# Patient Record
Sex: FEMALE | Race: WHITE | NOT HISPANIC OR LATINO | Employment: FULL TIME | URBAN - METROPOLITAN AREA
[De-identification: names, ages, dates, MRNs, and addresses within clinical notes are randomized per-mention and may not be internally consistent; named-entity substitution may affect disease eponyms.]

---

## 2019-05-22 ENCOUNTER — OFFICE VISIT (OUTPATIENT)
Dept: OBSTETRICS AND GYNECOLOGY | Facility: CLINIC | Age: 49
End: 2019-05-22
Payer: COMMERCIAL

## 2019-05-22 VITALS
HEIGHT: 63 IN | BODY MASS INDEX: 24.8 KG/M2 | WEIGHT: 140 LBS | SYSTOLIC BLOOD PRESSURE: 170 MMHG | DIASTOLIC BLOOD PRESSURE: 100 MMHG

## 2019-05-22 DIAGNOSIS — Z12.31 SCREENING MAMMOGRAM, ENCOUNTER FOR: ICD-10-CM

## 2019-05-22 DIAGNOSIS — Z01.419 ENCOUNTER FOR GYNECOLOGICAL EXAMINATION WITHOUT ABNORMAL FINDING: Primary | ICD-10-CM

## 2019-05-22 PROCEDURE — S0610 ANNUAL GYNECOLOGICAL EXAMINA: HCPCS | Performed by: OBSTETRICS & GYNECOLOGY

## 2019-05-22 RX ORDER — ACETAMINOPHEN 160 MG
TABLET,CHEWABLE ORAL
COMMUNITY

## 2019-05-22 ASSESSMENT — ENCOUNTER SYMPTOMS
HEADACHES: 0
PALPITATIONS: 0
DYSURIA: 0
RECTAL PAIN: 0
DIFFICULTY URINATING: 0
ANAL BLEEDING: 0
APPETITE CHANGE: 0
ACTIVITY CHANGE: 0
WHEEZING: 0
ABDOMINAL DISTENTION: 0
DIARRHEA: 0
CONSTIPATION: 0
CONFUSION: 0
VOICE CHANGE: 0
APNEA: 0
FREQUENCY: 0
SHORTNESS OF BREATH: 0
ADENOPATHY: 0

## 2019-05-22 NOTE — PATIENT INSTRUCTIONS
Continue with self breast examinations, please get your mammogram done.  Your Pap smear was done and if abnormal I will call you.  Breast exam and pelvic exam was normal.  Take vitamin D3 2000 units on a daily basis.

## 2019-05-22 NOTE — PROGRESS NOTES
"2019  Cayla Burris is a 48 y.o. female who presents for annual exam.   Periods are regular every 28-30 days, lasting 3 days. Dysmenorrhea:none. Cyclic symptoms include none. No intermenstrual bleeding, spotting, or discharge.    The patient is sexually active. GYN screening history: last pap: was normal. Domestic violence: no.     Current contraception: none  History of abnormal Pap smear: no  Family history of uterine or ovarian cancer: no  Regular self breast exam: yes  History of abnormal mammogram: no  Family history of breast cancer: no  History of abnormal lipids: no  Menstrual History:  OB History      Para Term  AB Living    3 3 3     3    SAB TAB Ectopic Multiple Live Births            3         Patient's last menstrual period was 2019.         Review of Systems and Physical Exam  The following have been reviewed and updated as appropriate in this visit: Allergies  Meds       BP (!) 170/100   Ht 1.6 m (5' 3\")   Wt 63.5 kg (140 lb)   LMP 2019   BMI 24.80 kg/m²   HPI  Review of Systems   Constitutional: Negative for activity change and appetite change.   HENT: Negative for voice change.    Eyes: Negative for visual disturbance.   Respiratory: Negative for apnea, shortness of breath and wheezing.    Cardiovascular: Negative for chest pain and palpitations.   Gastrointestinal: Negative for abdominal distention, anal bleeding, constipation, diarrhea and rectal pain.   Genitourinary: Negative for difficulty urinating, dysuria, frequency and urgency.   Neurological: Negative for headaches.   Hematological: Negative for adenopathy.   Psychiatric/Behavioral: Negative for behavioral problems and confusion.     Physical Exam   Genitourinary:   Genitourinary Comments: Vulva normal vagina normal cervix normal uterus anterior normal size adnexa negative, Pap smear done   Pulmonary/Chest: Right breast exhibits no inverted nipple, no mass, no nipple discharge, no skin change and no " tenderness. Left breast exhibits no inverted nipple, no mass, no nipple discharge, no skin change and no tenderness.       Diagnoses and all orders for this visit:    Encounter for gynecological examination without abnormal finding  -     Cytology, Thinprep Pap  -     PAP AND HR HPV W/REFLEX TO GENOTYPING 16,18/45    Screening mammogram, encounter for  -     BI SCREENING MAMMOGRAM BILATERAL; Future    .  All questions answered.  Breast self exam technique reviewed and patient encouraged to perform self-exam monthly.  Diagnosis explained in detail, including differential.  Discussed healthy lifestyle modifications.  Mammogram.  Thin prep Pap smear..  Return in about 1 year (around 5/22/2020).  Dejuan Paulino MD

## 2019-05-28 LAB
CYTOLOGIST CVX/VAG CYTO: ABNORMAL
CYTOLOGY CVX/VAG DOC CYTO: ABNORMAL
CYTOLOGY CVX/VAG DOC THIN PREP: ABNORMAL
DX ICD CODE: ABNORMAL
DX ICD CODE: ABNORMAL
HPV I/H RISK 4 DNA CVX QL PROBE+SIG AMP: NEGATIVE
LAB CORP NOTE:: ABNORMAL
OTHER STN SPEC: ABNORMAL
PATHOLOGIST CVX/VAG CYTO: ABNORMAL
STAT OF ADQ CVX/VAG CYTO-IMP: ABNORMAL

## 2019-05-30 ENCOUNTER — DOCUMENTATION (OUTPATIENT)
Dept: OBSTETRICS AND GYNECOLOGY | Facility: CLINIC | Age: 49
End: 2019-05-30

## 2019-10-29 ENCOUNTER — OFFICE VISIT (OUTPATIENT)
Dept: OBSTETRICS AND GYNECOLOGY | Facility: CLINIC | Age: 49
End: 2019-10-29
Payer: COMMERCIAL

## 2019-10-29 VITALS
SYSTOLIC BLOOD PRESSURE: 140 MMHG | BODY MASS INDEX: 24.63 KG/M2 | WEIGHT: 139 LBS | DIASTOLIC BLOOD PRESSURE: 80 MMHG | HEIGHT: 63 IN

## 2019-10-29 DIAGNOSIS — N93.9 ABNORMAL UTERINE BLEEDING: Primary | ICD-10-CM

## 2019-10-29 PROCEDURE — 58100 BIOPSY OF UTERUS LINING: CPT | Performed by: OBSTETRICS & GYNECOLOGY

## 2019-10-29 PROCEDURE — 99213 OFFICE O/P EST LOW 20 MIN: CPT | Mod: 25 | Performed by: OBSTETRICS & GYNECOLOGY

## 2019-10-29 RX ORDER — METOPROLOL SUCCINATE 25 MG/1
TABLET, EXTENDED RELEASE ORAL
COMMUNITY
End: 2021-04-19 | Stop reason: ALTCHOICE

## 2019-10-29 RX ORDER — HYDROCHLOROTHIAZIDE 12.5 MG/1
CAPSULE ORAL
COMMUNITY
End: 2021-04-19 | Stop reason: ALTCHOICE

## 2019-10-29 RX ORDER — VALSARTAN 40 MG/1
TABLET ORAL
COMMUNITY
End: 2021-04-19 | Stop reason: ALTCHOICE

## 2019-10-29 RX ORDER — RIZATRIPTAN BENZOATE 10 MG/1
TABLET ORAL
COMMUNITY
End: 2024-03-18 | Stop reason: SDUPTHER

## 2019-10-29 NOTE — PROCEDURES
BX Endometrial  Date/Time: 10/29/2019 2:12 PM  Performed by: Marilee Reyes MD  Authorized by: Marilee Reyes MD     Consent:     Consent obtained:  Written    Consent given by:  Patient    Procedural risks discussed:  Bleeding, damage to other organs, failure rate, infection and repeat procedure    Patient questions answered: yes      Patient agrees, verbalizes understanding, and wants to proceed: yes    Indication:     Indications: Other disorder of menstruation and other abnormal bleeding from female genital tract    Procedure:     Procedure: endometrial biopsy with Pipelle      A bivalve speculum was placed in the vagina: yes      Cervix cleaned and prepped: yes      Uterus sounded: yes      Uterus sound depth (cm):  8    Specimen collected: specimen collected and sent to pathology      Patient tolerated procedure well with no complications: yes      Estimated blood loss: minimal  Findings:     Uterus size:  6-8 weeks    Cervix: normal      Adnexa: normal    Comments:     Procedure comments:  Pt with ongoing prolonged abnormal bleeding.  Recommend endometrial biopsy today.  Pt accepts.  R/B/A/C reviewed, consent reviewed and signed.  She is unable to void.  Her  had vasectomy and she does not believe there is any chance of pregnancy.    Cervix visualized and swabbed with betadine.  Uterus sounded to 8 cm.  3 passes made, good specimen obtained.  Pt tolerated procedure well.  Will send specimen to lab and call pt with results

## 2019-10-30 NOTE — PROGRESS NOTES
"Patient ID: Cayla Burris   : 1970  MRN: [id number]   Visit Date: 10/29/2019    Subjective   Cayla Burris is presenting today for Abnormal Uterine Bleeding    Pt was having fairly regular menses last year.  This year has skipped a few months.  Then recently last month started bleeding and this has continued daily for almost 4 weeks.  At times light, at times heavy gushing.  This has never happened before.      Objective   Vital Signs for this encounter:   Visit Vitals  /80   Ht 1.6 m (5' 3\")   Wt 63 kg (139 lb)   LMP  (LMP Unknown)   Breastfeeding? No   BMI 24.62 kg/m²       Obstetric History:   OB History    Para Term  AB Living   3 3 3     3   SAB TAB Ectopic Multiple Live Births           3      # Outcome Date GA Lbr Aleksandar/2nd Weight Sex Delivery Anes PTL Lv   3 Term      Vag-Spont   CADY   2 Term      Vag-Spont   CADY   1 Term      Vag-Spont   CADY     Past Medical History:  has a past medical history of HTN (hypertension), Migraine, Seasonal allergies, and Tobacco use.  Past Surgical History:  has a past surgical history that includes Vaginal delivery; Hernia repair; and Breast lumpectomy.  Family History: family history includes No Known Problems in her biological father and biological mother.  Social History:  reports that she has been smoking. She has never used smokeless tobacco. She reports that she drinks alcohol. She reports that she does not use drugs.  Medications:   Current Outpatient Medications:   •  hydrochlorothiazide (MICROZIDE) 12.5 mg capsule, hydrochlorothiazide 12.5 mg capsule, Disp: , Rfl:   •  loratadine (CLARITIN) 5 mg/5 mL syrup, No SIG Entered, Disp: , Rfl:   •  metoprolol succinate XL (TOPROL-XL) 25 mg 24 hr tablet, metoprolol succinate ER 25 mg tablet,extended release 24 hr, Disp: , Rfl:   •  rizatriptan (MAXALT) 10 mg tablet, rizatriptan 10 mg tablet  One tablet under tongue May be repeated in 2 hs. Wait 72 hours before repeating regimen You can take Excedrin " "migraine for 72 Hs, before repeating Rizatriptan again., Disp: , Rfl:   •  valsartan (DIOVAN) 40 mg tablet, valsartan 40 mg tablet, Disp: , Rfl:     Allergies: is allergic to penicillins.       Review of Systems and Physical Exam  The following have been reviewed and updated as appropriate in this visit: Allergies  Meds  Problems       Visit Vitals  /80   Ht 1.6 m (5' 3\")   Wt 63 kg (139 lb)   LMP  (LMP Unknown)   Breastfeeding? No   BMI 24.62 kg/m²       Review of Systems  Physical Exam   Constitutional: She appears well-developed and well-nourished.   Genitourinary: Uterus normal.   External female genitalia normal.   Urethral meatus normal. There is bleeding in the vagina.   Cervix exam normal.  Uterus is normal. Uterus is mobile. Uterus is not tender.   Adnexa normal. Right adnexum does not display tenderness and does not display fullness. Left adnexum does not display tenderness and does not display fullness.   Psychiatric: She has a normal mood and affect. Her behavior is normal.         Assessment/Plan   Diagnoses and all orders for this visit:    Abnormal uterine bleeding  -     DHEA-sulfate; Future  -     Follicle stimulating hormone; Future  -     Hemoglobin A1c; Future  -     Luteinizing hormone; Future  -     Prolactin; Future  -     T4, free; Future  -     Testosterone, free, total; Future  -     TSH; Future  -     ESTRADIOL; Future  -     CBC and Differential; Future  -     BhCG, Serum, Quant; Future  -     US PELVIS TRANSABDOMINAL & TRANSVAGINAL; Future  -     Pathology Tissue Exam  -     BX Endometrial    .    Discussed diff dx for abnormal prolonged bleeding- hormonal causes vs. Anatomic causes.  Could be related to perimenopausal changes but unclear.  Recommend endometrial biopsy today.  Pt accepts.  See proceudre note.  Will send for hormonal bloodwork and ultrasound.  Will call pt with results    Mgmt will depend on results.  Pt not eligible for OCPs due to HTN and occasional tobacco " use.  Discussed options of mirena IUD, ablation, etc briefly.  Bleeding may also improve on its own or pt may choose not to intervene.  Will discuss further once results available.          Marilee Reyes MD

## 2019-11-01 LAB
DX ICD CODE: NORMAL
DX ICD CODE: NORMAL
PATH REPORT.FINAL DX SPEC: NORMAL
PATH REPORT.GROSS SPEC: NORMAL
PATH REPORT.RELEVANT HX SPEC: NORMAL
PATH REPORT.SITE OF ORIGIN SPEC: NORMAL
PATHOLOGIST NAME: NORMAL
PAYMENT PROCEDURE: NORMAL

## 2019-11-06 ENCOUNTER — TELEPHONE (OUTPATIENT)
Dept: OBSTETRICS AND GYNECOLOGY | Facility: CLINIC | Age: 49
End: 2019-11-06

## 2019-11-06 NOTE — TELEPHONE ENCOUNTER
Spoke with pt.  Endo bx benign disordered prolif endo.  She will go for bloodwork and ultrasound soon.  Will need to decide to intervene based on menstrual symtpoms, pt to monitor.  All questionsn answered

## 2019-11-27 ENCOUNTER — HOSPITAL ENCOUNTER (OUTPATIENT)
Dept: RADIOLOGY | Age: 49
Discharge: HOME | End: 2019-11-27
Attending: OBSTETRICS & GYNECOLOGY
Payer: COMMERCIAL

## 2019-11-27 DIAGNOSIS — N93.9 ABNORMAL UTERINE BLEEDING: ICD-10-CM

## 2019-11-27 PROCEDURE — 76856 US EXAM PELVIC COMPLETE: CPT

## 2019-12-04 ENCOUNTER — TELEPHONE (OUTPATIENT)
Dept: OBSTETRICS AND GYNECOLOGY | Facility: CLINIC | Age: 49
End: 2019-12-04

## 2019-12-05 ENCOUNTER — TELEPHONE (OUTPATIENT)
Dept: OBSTETRICS AND GYNECOLOGY | Facility: CLINIC | Age: 49
End: 2019-12-05

## 2019-12-05 NOTE — TELEPHONE ENCOUNTER
Pt returning your call about US results. Please try again.    bones(Osteoporosis,prev fx,steroid use,metastatic bone ca)

## 2019-12-05 NOTE — TELEPHONE ENCOUNTER
Spoke with pt.  She is still having ongoing bleeding.  She still needs to go for bloodwork.      Discussed ultrasound results.  Right ov cyst 3.5 cm likely related to ovulation.  Uterine lining thickened and irrgular, endo bx was benign.  Discussed mgmt options at length including IUD and ablation, R/B/A?C reveiwed, pt will research and consider.  Will call pt with bloodwork results and make final decision.  All questions answered

## 2020-01-04 LAB
BASOPHILS # BLD AUTO: 0 X10E3/UL (ref 0–0.2)
BASOPHILS NFR BLD AUTO: 1 %
DHEA-S SERPL-MCNC: 252.9 UG/DL (ref 41.2–243.7)
EOSINOPHIL # BLD AUTO: 0.2 X10E3/UL (ref 0–0.4)
EOSINOPHIL NFR BLD AUTO: 3 %
ERYTHROCYTE [DISTWIDTH] IN BLOOD BY AUTOMATED COUNT: 13.1 % (ref 12.3–15.4)
ESTRADIOL SERPL-MCNC: 101.6 PG/ML
FSH SERPL-ACNC: 7.5 MIU/ML
HBA1C MFR BLD: 4.6 % (ref 4.8–5.6)
HCG INTACT+B SERPL-ACNC: <1 MIU/ML
HCT VFR BLD AUTO: 41.2 % (ref 34–46.6)
HGB BLD-MCNC: 14.2 G/DL (ref 11.1–15.9)
IMM GRANULOCYTES # BLD AUTO: 0 X10E3/UL (ref 0–0.1)
IMM GRANULOCYTES NFR BLD AUTO: 0 %
LH SERPL-ACNC: 5.3 MIU/ML
LYMPHOCYTES # BLD AUTO: 1.8 X10E3/UL (ref 0.7–3.1)
LYMPHOCYTES NFR BLD AUTO: 33 %
MCH RBC QN AUTO: 32.5 PG (ref 26.6–33)
MCHC RBC AUTO-ENTMCNC: 34.5 G/DL (ref 31.5–35.7)
MCV RBC AUTO: 94 FL (ref 79–97)
MONOCYTES # BLD AUTO: 0.5 X10E3/UL (ref 0.1–0.9)
MONOCYTES NFR BLD AUTO: 9 %
NEUTROPHILS # BLD AUTO: 2.9 X10E3/UL (ref 1.4–7)
NEUTROPHILS NFR BLD AUTO: 54 %
PLATELET # BLD AUTO: 228 X10E3/UL (ref 150–450)
PROLACTIN SERPL-MCNC: 19.5 NG/ML (ref 4.8–23.3)
RBC # BLD AUTO: 4.37 X10E6/UL (ref 3.77–5.28)
T4 FREE SERPL-MCNC: 1.13 NG/DL (ref 0.82–1.77)
TESTOST FREE SERPL-MCNC: 2.6 PG/ML (ref 0–4.2)
TESTOST SERPL-MCNC: 62 NG/DL (ref 8–48)
TSH SERPL DL<=0.005 MIU/L-ACNC: 6.17 UIU/ML (ref 0.45–4.5)
WBC # BLD AUTO: 5.4 X10E3/UL (ref 3.4–10.8)

## 2020-01-07 ENCOUNTER — TELEPHONE (OUTPATIENT)
Dept: OBSTETRICS AND GYNECOLOGY | Facility: CLINIC | Age: 50
End: 2020-01-07

## 2020-01-07 NOTE — TELEPHONE ENCOUNTER
Testosterone and DHEAS elevated, also TSH abnormal - may need to see PCP and get treated!  LMOM for pt to call back to discuss

## 2020-01-08 ENCOUNTER — TELEPHONE (OUTPATIENT)
Dept: OBSTETRICS AND GYNECOLOGY | Facility: CLINIC | Age: 50
End: 2020-01-08

## 2020-01-08 NOTE — TELEPHONE ENCOUNTER
Pt is returning your phone call. Pt lmom in office. She is calling about test results. Can you please try again.

## 2020-01-08 NOTE — TELEPHONE ENCOUNTER
Testosterone elevated, may be related to perimenopause.  TSH also 6.1, elevated, pt is having some symptoms, this could be related to abnormal bleeding as well.   Pt wishes to address this with PCP first and will monitor bleeding.  If bleeding still unacceptable will call, would consider IUD or ablation.  All questions answered

## 2021-02-15 ENCOUNTER — TELEPHONE (OUTPATIENT)
Dept: FAMILY MEDICINE | Facility: CLINIC | Age: 51
End: 2021-02-15

## 2021-02-15 NOTE — TELEPHONE ENCOUNTER
Patient wanted doctor to order her an xray because she thinks she broke her toe. Explained to patient that she is not yet a patient of Dr. Milligan. She will be after her visit in April. Urgent Care and ER are an option.

## 2021-02-23 ENCOUNTER — HOSPITAL ENCOUNTER (OUTPATIENT)
Dept: RADIOLOGY | Age: 51
Discharge: HOME | End: 2021-02-23
Attending: FAMILY MEDICINE
Payer: COMMERCIAL

## 2021-02-23 ENCOUNTER — TELEPHONE (OUTPATIENT)
Dept: OBSTETRICS AND GYNECOLOGY | Facility: CLINIC | Age: 51
End: 2021-02-23

## 2021-02-23 DIAGNOSIS — Z12.31 BREAST CANCER SCREENING BY MAMMOGRAM: ICD-10-CM

## 2021-02-23 DIAGNOSIS — Z12.31 BREAST CANCER SCREENING BY MAMMOGRAM: Primary | ICD-10-CM

## 2021-02-23 PROCEDURE — 77063 BREAST TOMOSYNTHESIS BI: CPT

## 2021-02-26 ENCOUNTER — HOSPITAL ENCOUNTER (OUTPATIENT)
Dept: RADIOLOGY | Age: 51
Discharge: HOME | End: 2021-02-26
Attending: RADIOLOGY
Payer: COMMERCIAL

## 2021-02-26 DIAGNOSIS — R92.8 ABNORMAL MAMMOGRAM: ICD-10-CM

## 2021-02-26 PROCEDURE — G0279 TOMOSYNTHESIS, MAMMO: HCPCS | Mod: LT

## 2021-02-26 PROCEDURE — 76642 ULTRASOUND BREAST LIMITED: CPT | Mod: LT

## 2021-03-30 ENCOUNTER — OFFICE VISIT (OUTPATIENT)
Dept: OBSTETRICS AND GYNECOLOGY | Facility: CLINIC | Age: 51
End: 2021-03-30
Payer: COMMERCIAL

## 2021-03-30 VITALS
DIASTOLIC BLOOD PRESSURE: 60 MMHG | BODY MASS INDEX: 26.58 KG/M2 | WEIGHT: 150 LBS | SYSTOLIC BLOOD PRESSURE: 120 MMHG | HEIGHT: 63 IN

## 2021-03-30 DIAGNOSIS — N63.0 LUMP OR MASS IN BREAST: ICD-10-CM

## 2021-03-30 DIAGNOSIS — Z01.419 ENCOUNTER FOR ANNUAL ROUTINE GYNECOLOGICAL EXAMINATION: Primary | ICD-10-CM

## 2021-03-30 PROCEDURE — S0612 ANNUAL GYNECOLOGICAL EXAMINA: HCPCS | Performed by: OBSTETRICS & GYNECOLOGY

## 2021-03-30 RX ORDER — CYCLOBENZAPRINE HCL 5 MG
TABLET ORAL
COMMUNITY
Start: 2021-03-22 | End: 2021-04-19 | Stop reason: SDUPTHER

## 2021-03-30 RX ORDER — AMLODIPINE BESYLATE 5 MG/1
5 TABLET ORAL
COMMUNITY
Start: 2021-02-06 | End: 2021-11-09 | Stop reason: SDUPTHER

## 2021-03-30 ASSESSMENT — ENCOUNTER SYMPTOMS
COUGH: 0
HEADACHES: 0
DIFFICULTY URINATING: 0
TROUBLE SWALLOWING: 0
BLOOD IN STOOL: 0
ABDOMINAL PAIN: 0
CONSTITUTIONAL NEGATIVE: 1
FREQUENCY: 0
ABDOMINAL DISTENTION: 0
DYSURIA: 0
FEVER: 0
SHORTNESS OF BREATH: 0
ARTHRALGIAS: 0
SORE THROAT: 0
WHEEZING: 0
FATIGUE: 0
PALPITATIONS: 0
BRUISES/BLEEDS EASILY: 0

## 2021-03-30 NOTE — PROGRESS NOTES
"Visit Date: 3/30/2021   Cayla Burris is 50 y.o. female presenting today for Annual GYN Exam    The following have been reviewed and updated as appropriate in this visit:      Visit Vitals  /60   Ht 1.6 m (5' 3\")   Wt 68 kg (150 lb)   LMP 2020   BMI 26.57 kg/m²     Menstrual History:  OB History        3    Para   3    Term   3            AB        Living   3       SAB        TAB        Ectopic        Multiple        Live Births   3                Patient's last menstrual period was 2020.       Medications:   Current Outpatient Medications:   •  amLODIPine (NORVASC) 5 mg tablet, Take 5 mg by mouth once daily., Disp: , Rfl:   •  cyclobenzaprine (FLEXERIL) 5 mg tablet, TAKE 1 TABLET BY MOUTH TWICE DAILY AFTER MEALS FOR 10 DAYS, Disp: , Rfl:   •  rizatriptan (MAXALT) 10 mg tablet, rizatriptan 10 mg tablet  One tablet under tongue May be repeated in 2 hs. Wait 72 hours before repeating regimen You can take Excedrin migraine for 72 Hs, before repeating Rizatriptan again., Disp: , Rfl:   •  hydrochlorothiazide (MICROZIDE) 12.5 mg capsule, hydrochlorothiazide 12.5 mg capsule, Disp: , Rfl:   •  loratadine (CLARITIN) 5 mg/5 mL syrup, No SIG Entered, Disp: , Rfl:   •  metoprolol succinate XL (TOPROL-XL) 25 mg 24 hr tablet, metoprolol succinate ER 25 mg tablet,extended release 24 hr, Disp: , Rfl:   •  valsartan (DIOVAN) 40 mg tablet, valsartan 40 mg tablet, Disp: , Rfl:     Allergies: is allergic to penicillins.     Past Medical History:  has a past medical history of Chronic fatigue, HTN (hypertension), Hypothyroid, Migraine, Seasonal allergies, and Tobacco use.  Past Surgical History:  has a past surgical history that includes Vaginal delivery; Hernia repair; and Breast lumpectomy.  Family History: family history includes No Known Problems in her biological father and biological mother.  Social History:  reports that she has been smoking. She has never used smokeless tobacco. She reports " current alcohol use. She reports that she does not use drugs.      HPI patient is here for routine gynecological check.  She notes that she has not had a menstrual cycle since November.  She is having some vasomotor symptoms during the day and they are not disruptive to her lifestyle.    Denies any bleeding.  No change in her bowel or bladder habits.  No change in her abdominal girth.    Patient's past medical history includes chronic fatigue hypertension hypothyroidism seasonal allergies and migraine patient recently had a mammogram that showed a possible lymph node a 6-month follow-up ultrasound is recommended.     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    Patient has 3 children her daughter is in college and was a school.  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  Review of Systems   Constitutional: Negative.  Negative for fatigue and fever.   HENT: Negative for ear pain, sore throat and trouble swallowing.    Respiratory: Negative for cough, shortness of breath and wheezing.    Cardiovascular: Negative for chest pain and palpitations.   Gastrointestinal: Negative for abdominal distention, abdominal pain and blood in stool.   Genitourinary: Negative for difficulty urinating, dysuria and frequency.   Musculoskeletal: Negative for arthralgias.   Neurological: Negative for headaches.   Hematological: Does not bruise/bleed easily.     Physical Exam  Constitutional:       Appearance: She is well-developed.   Genitourinary:      Genitourinary Comments: The external genitalia are within normal limits with no lesions.  The vagina has no lesions or discharge.  The cervix is pink, firm, mobile and nontender.  The uterus is anteverted, firm, mobile and nontender.  It is normal size.  The adnexa have no masses or tenderness.      The supraclavicular and axillary lymph nodes are nonenlarged.  The breasts have no masses, lesions or skin changes.      Eyes:      Pupils: Pupils are equal, round, and reactive to light.   Neck:      Musculoskeletal: Normal range of motion and neck supple.      Thyroid: No thyromegaly.   Cardiovascular:      Rate and Rhythm: Normal rate and regular rhythm.   Pulmonary:      Effort: Pulmonary effort is normal.   Abdominal:      General: Bowel sounds are normal. There is no distension.      Palpations: Abdomen is soft.      Tenderness: There is no abdominal tenderness. There is no guarding.   Neurological:      Mental Status: She is alert and oriented to person, place, and time.   Skin:     General: Skin is warm and dry.   Psychiatric:         Behavior: Behavior normal.       Problem List Items Addressed This Visit     None        A Pap smear was performed.  Mammogram slip was provided for 6 months along with an ultrasound.  Patient should return in 1 year      No follow-ups on file.  Voice recognition software used to produce this document. If errors are discovered,corrections will be made.   Domenic Sharma MD

## 2021-03-30 NOTE — PATIENT INSTRUCTIONS
I performed a Pap smear.  I will contact you by phone if there is a problem.  The results will be available on the patient portal.    A mammogram slip is provided. You should receive a letter with the results within a week after the procedure. If you do not receive a letter with the results, please call us.    Please return in 1 year for routine checkup.

## 2021-04-02 LAB
CYTOLOGIST CVX/VAG CYTO: NORMAL
CYTOLOGY CVX/VAG DOC CYTO: NORMAL
CYTOLOGY CVX/VAG DOC THIN PREP: NORMAL
DX ICD CODE: NORMAL
HPV I/H RISK 4 DNA CVX QL PROBE+SIG AMP: NEGATIVE
LAB CORP NOTE:: NORMAL
OTHER STN SPEC: NORMAL
STAT OF ADQ CVX/VAG CYTO-IMP: NORMAL

## 2021-04-19 ENCOUNTER — OFFICE VISIT (OUTPATIENT)
Dept: FAMILY MEDICINE | Facility: CLINIC | Age: 51
End: 2021-04-19
Payer: COMMERCIAL

## 2021-04-19 VITALS
WEIGHT: 147.8 LBS | TEMPERATURE: 98.3 F | DIASTOLIC BLOOD PRESSURE: 90 MMHG | RESPIRATION RATE: 13 BRPM | SYSTOLIC BLOOD PRESSURE: 140 MMHG | HEART RATE: 72 BPM | HEIGHT: 64 IN | OXYGEN SATURATION: 99 % | BODY MASS INDEX: 25.23 KG/M2

## 2021-04-19 DIAGNOSIS — R79.89 ELEVATED TESTOSTERONE LEVEL: ICD-10-CM

## 2021-04-19 DIAGNOSIS — I10 ESSENTIAL HYPERTENSION: Chronic | ICD-10-CM

## 2021-04-19 DIAGNOSIS — J30.2 SEASONAL ALLERGIES: Chronic | ICD-10-CM

## 2021-04-19 DIAGNOSIS — G43.009 MIGRAINE WITHOUT AURA AND WITHOUT STATUS MIGRAINOSUS, NOT INTRACTABLE: Chronic | ICD-10-CM

## 2021-04-19 DIAGNOSIS — B35.3 TINEA PEDIS OF RIGHT FOOT: Chronic | ICD-10-CM

## 2021-04-19 DIAGNOSIS — Z11.4 SCREENING FOR HUMAN IMMUNODEFICIENCY VIRUS: ICD-10-CM

## 2021-04-19 DIAGNOSIS — Z00.00 ENCOUNTER FOR WELL ADULT EXAM WITHOUT ABNORMAL FINDINGS: Primary | Chronic | ICD-10-CM

## 2021-04-19 DIAGNOSIS — G47.33 OSA ON CPAP: ICD-10-CM

## 2021-04-19 DIAGNOSIS — R53.83 FATIGUE, UNSPECIFIED TYPE: ICD-10-CM

## 2021-04-19 DIAGNOSIS — R79.89 ELEVATED TSH: Chronic | ICD-10-CM

## 2021-04-19 DIAGNOSIS — R79.89 ABNORMAL TSH: Chronic | ICD-10-CM

## 2021-04-19 DIAGNOSIS — Z11.59 NEED FOR HEPATITIS C SCREENING TEST: ICD-10-CM

## 2021-04-19 DIAGNOSIS — Z13.220 SCREENING, LIPID: ICD-10-CM

## 2021-04-19 PROCEDURE — 3077F SYST BP >= 140 MM HG: CPT | Performed by: FAMILY MEDICINE

## 2021-04-19 PROCEDURE — 3008F BODY MASS INDEX DOCD: CPT | Performed by: FAMILY MEDICINE

## 2021-04-19 PROCEDURE — 3080F DIAST BP >= 90 MM HG: CPT | Performed by: FAMILY MEDICINE

## 2021-04-19 PROCEDURE — 99204 OFFICE O/P NEW MOD 45 MIN: CPT | Performed by: FAMILY MEDICINE

## 2021-04-19 RX ORDER — CLOTRIMAZOLE AND BETAMETHASONE DIPROPIONATE 10; .64 MG/G; MG/G
CREAM TOPICAL 2 TIMES DAILY
Qty: 45 G | Refills: 1 | Status: SHIPPED | OUTPATIENT
Start: 2021-04-19 | End: 2021-05-19

## 2021-04-19 RX ORDER — CYCLOBENZAPRINE HCL 5 MG
5 TABLET ORAL 2 TIMES DAILY PRN
Qty: 30 TABLET | Refills: 1 | Status: SHIPPED | OUTPATIENT
Start: 2021-04-19 | End: 2022-04-15

## 2021-04-19 ASSESSMENT — ENCOUNTER SYMPTOMS
VOMITING: 0
ABDOMINAL DISTENTION: 0
DYSPHORIC MOOD: 0
CHEST TIGHTNESS: 0
NERVOUS/ANXIOUS: 0
POLYPHAGIA: 0
CONSTIPATION: 0
FATIGUE: 1
HEMATURIA: 0
DYSURIA: 0
SINUS PRESSURE: 0
SEIZURES: 0
MYALGIAS: 0
DECREASED CONCENTRATION: 0
HEADACHES: 1
WOUND: 0
EYE REDNESS: 0
ABDOMINAL PAIN: 0
WEAKNESS: 0
PHOTOPHOBIA: 0
VOICE CHANGE: 0
LIGHT-HEADEDNESS: 0
EYE PAIN: 0
SINUS PAIN: 0
POLYDIPSIA: 0
PALPITATIONS: 0
CHILLS: 0
SHORTNESS OF BREATH: 0
NAUSEA: 0
ARTHRALGIAS: 0
NECK STIFFNESS: 0
BRUISES/BLEEDS EASILY: 0
BACK PAIN: 0
NECK PAIN: 0
CONFUSION: 0
FLANK PAIN: 0
FEVER: 0
ADENOPATHY: 0
NUMBNESS: 0
JOINT SWELLING: 0
BLOOD IN STOOL: 0
WHEEZING: 0
DIZZINESS: 0
RHINORRHEA: 0
APNEA: 1
ACTIVITY CHANGE: 0
FREQUENCY: 0
SORE THROAT: 0
COUGH: 0
DIFFICULTY URINATING: 0

## 2021-04-19 ASSESSMENT — PATIENT HEALTH QUESTIONNAIRE - PHQ9: SUM OF ALL RESPONSES TO PHQ9 QUESTIONS 1 & 2: 0

## 2021-04-19 NOTE — ASSESSMENT & PLAN NOTE
Check and screen fasting labs  fluvax   tdap  covid vaccine  BMI reviewed   continue low fat carb diet, 3 meals daily . Avoid processed snacking between meals  Aerobic exercise 30 min or more 3-4 times per  Week  Dental visits q6m   Optho eye exam q1-2 years  Hearing screening nl  Wear seatbelts and no texting while driving  Wear spf 30 sunscreen or more while outside  for skin cancer protection   Gyn exam   Mammogram   htn controlled with meds   salina controlled with claritin  Migraines stable using prn maxalt  [id number]

## 2021-04-19 NOTE — PROGRESS NOTES
50 year old new patient presents to establish care   Former patient of dr Potter   Last seen 2019   oor followup in pandemic  Hx from patient and chart  fluvax 9/20  tdap ? Thinks in the last 10 years   covid vaccine needs to schedule    24 years  Children  19, 17, 15     C/o of rash between her right first and second toe for the last 2 weeks  Using otc cortisone  and has been helping    Hx of smoking   1 ppd per 3 days  Declined cessation today       htn   Dash diet  In the last 5 years  norvasc 5 mg one po qdaily   Has bp monitor but not taking regularly   Did not take today     Hx of headaches/migraine  Relates bitemporal  and triggered by her tmj syndrome  No aura  No n/v  mild photophobia  No preventative agents  Has Maxalt 10 mg as abortant but has not needed  Last headache over 10 m ago     C/o of snoring chronically  witnessed apnea by    Daytime fatigue   Had home sleep study showing moderate sleep apnea 11/20 sleep [facility]   Has cpap 11/20  and using needs to be more compliant   Not compliant   No change in weight   No followup     Hx of bruxism   Using mouth monroe and flexaril 5mg for prn use for spasm prn   Needs refill of flexaril      Was having DUB 10/19   Seen by her gyn Dr Reyes 10/19  Abn tsh 6.1, nl Free t4   DHEAS 252.9 testosterone total 62 , free  testo 2.6  Was referred to dr López who ordered followup labs   Then pandemic occurred and has not had any followup   Seen by Dr Sharma and had pap 3/30/21   No menses since 11/20 vasomotor sx's in the day  No further DUB       The following have been reviewed and updated as appropriate in this visit:  Allergies  Meds  Problems         Past Medical History:   Diagnosis Date   • Essential hypertension    • Migraine    • TIMO on CPAP    • Seasonal allergies    • Tobacco use        Past Surgical History:   Procedure Laterality Date   • BREAST LUMPECTOMY     • BUNIONECTOMY Left     1998   • HERNIA REPAIR     •  VAGINAL DELIVERY      x 3       Family History   Problem Relation Age of Onset   • No Known Problems Biological Father    • No Known Problems Biological Mother    • No Known Problems Biological Sister        Social History     Tobacco Use   • Smoking status: Current Some Day Smoker     Packs/day: 0.30     Years: 10.00     Pack years: 3.00     Types: Cigarettes   • Smokeless tobacco: Never Used   Vaping Use   • Vaping Use: Never used   Substance Use Topics   • Alcohol use: Yes     Alcohol/week: 2.0 standard drinks     Types: 2 Glasses of wine per week   • Drug use: No       Allergies   Allergen Reactions   • Penicillins      Other reaction(s): Rash       Current Outpatient Medications   Medication Sig Dispense Refill   • amLODIPine (NORVASC) 5 mg tablet Take 5 mg by mouth once daily.     • cyclobenzaprine (FLEXERIL) 5 mg tablet Take 1 tablet (5 mg total) by mouth 2 (two) times a day as needed for muscle spasms. 30 tablet 1   • loratadine (CLARITIN) 5 mg/5 mL syrup No SIG Entered     • rizatriptan (MAXALT) 10 mg tablet rizatriptan 10 mg tablet   One tablet under tongue May be repeated in 2 hs. Wait 72 hours before repeating regimen You can take Excedrin migraine for 72 Hs, before repeating Rizatriptan again.     • clotrimazole-betamethasone (LOTRISONE) 1-0.05 % cream Apply topically 2 (two) times a day. 45 g 1     No current facility-administered medications for this visit.       Review of Systems   Constitutional: Positive for fatigue. Negative for activity change, chills and fever.   HENT: Negative for congestion, ear discharge, ear pain, hearing loss, postnasal drip, rhinorrhea, sinus pressure, sinus pain, sneezing, sore throat, tinnitus and voice change.    Eyes: Negative for photophobia, pain, redness and visual disturbance.   Respiratory: Positive for apnea. Negative for cough, chest tightness, shortness of breath and wheezing.         Chronic snoring    Cardiovascular: Negative for chest pain, palpitations and  "leg swelling.   Gastrointestinal: Negative for abdominal distention, abdominal pain, blood in stool, constipation, nausea and vomiting.   Endocrine: Negative for cold intolerance, heat intolerance, polydipsia, polyphagia and polyuria.   Genitourinary: Negative for decreased urine volume, difficulty urinating, dyspareunia, dysuria, flank pain, frequency, hematuria, menstrual problem, pelvic pain, vaginal bleeding, vaginal discharge and vaginal pain.   Musculoskeletal: Negative for arthralgias, back pain, gait problem, joint swelling, myalgias, neck pain and neck stiffness.   Skin: Positive for rash. Negative for pallor and wound.        Right foot interdigitally between right first and second toe    Allergic/Immunologic: Negative for environmental allergies and food allergies.   Neurological: Positive for headaches. Negative for dizziness, seizures, weakness, light-headedness and numbness.   Hematological: Negative for adenopathy. Does not bruise/bleed easily.   Psychiatric/Behavioral: Negative for behavioral problems, confusion, decreased concentration and dysphoric mood. The patient is not nervous/anxious.        Objective     Vitals:    04/19/21 1448   BP: 140/90   Pulse: 72   Resp: 13   Temp: 36.8 °C (98.3 °F)   SpO2: 99%     Vitals:    04/19/21 1448   BP: 140/90   BP Location: Left upper arm   Patient Position: Sitting   Pulse: 72   Resp: 13   Temp: 36.8 °C (98.3 °F)   TempSrc: Oral   SpO2: 99%   Weight: 67 kg (147 lb 12.8 oz)   Height: 1.62 m (5' 3.78\")       Physical Exam  Vitals and nursing note reviewed.   Constitutional:       General: She is not in acute distress.     Appearance: She is not diaphoretic.   HENT:      Head: Normocephalic and atraumatic.      Right Ear: Tympanic membrane, ear canal and external ear normal.      Left Ear: Tympanic membrane, ear canal and external ear normal.      Nose: Nose normal.      Mouth/Throat:      Pharynx: No oropharyngeal exudate.   Eyes:      General: No scleral " icterus.        Right eye: No discharge.         Left eye: No discharge.      Conjunctiva/sclera: Conjunctivae normal.      Pupils: Pupils are equal, round, and reactive to light.   Neck:      Thyroid: No thyromegaly.      Vascular: No JVD.      Trachea: No tracheal deviation.   Cardiovascular:      Rate and Rhythm: Normal rate and regular rhythm.      Heart sounds: Normal heart sounds. No murmur heard.   No friction rub. No gallop.    Pulmonary:      Effort: Pulmonary effort is normal. No respiratory distress.      Breath sounds: Normal breath sounds. No wheezing or rales.   Chest:      Chest wall: No tenderness.   Abdominal:      General: Bowel sounds are normal. There is no distension.      Palpations: Abdomen is soft. There is no mass.      Tenderness: There is no abdominal tenderness. There is no guarding or rebound.      Hernia: No hernia is present.   Musculoskeletal:         General: No tenderness or deformity.      Cervical back: Normal range of motion and neck supple.   Lymphadenopathy:      Cervical: No cervical adenopathy.   Skin:     Coloration: Skin is not pale.      Findings: No erythema or rash.      Comments: Mild erythema and scaling  between first and second toes on the right    Neurological:      Mental Status: She is alert and oriented to person, place, and time.      Cranial Nerves: No cranial nerve deficit.      Sensory: No sensory deficit.      Motor: No abnormal muscle tone.      Coordination: Coordination normal.      Deep Tendon Reflexes: Reflexes normal.   Psychiatric:         Mood and Affect: Mood is anxious.         Speech: Speech normal.         Behavior: Behavior normal.         Thought Content: Thought content normal.         Judgment: Judgment normal.         Lab Results   Component Value Date    WBC 5.4 01/03/2020    HGB 14.2 01/03/2020    HCT 41.2 01/03/2020    MCV 94 01/03/2020     01/03/2020         Chemistry    No results found for: NA, K, CL, CO2, BUN, CREATININE, GLU  No results found for: CALCIUM, ALKPHOS, AST, ALT, BILITOT         No results found for: CHOL  No results found for: HDL  No results found for: LDLCALC  No results found for: TRIG  No results found for: CHOLHDL    Lab Results   Component Value Date    TSH 6.170 (H) 01/03/2020       Lab Results   Component Value Date    HGBA1C 4.6 (L) 01/03/2020       No results found for: HAV, HEPAIGM, HEPBIGM, HEPBCAB, HBEAG, HEPCAB    No results found for: MICROALBUR, [id number]    Assessment/Plan     1. Encounter for well adult exam without abnormal findings    2. Screening, lipid    3. Screening for human immunodeficiency virus    4. Need for hepatitis C screening test    5. Fatigue, unspecified type    6. TIMO on CPAP    7. Elevated testosterone level    8. Essential hypertension    9. Migraine without aura and without status migrainosus, not intractable    10. Tinea pedis of right foot    11. Seasonal allergies    12. Abnormal TSH    13. Elevated TSH        Problem List Items Addressed This Visit        Nervous    Migraine (Chronic)    Current Assessment & Plan     will request old records from previous pcp dr Potter to review  Has not had headache in the last 10mg   Has Maxalt 10mg but has not needed  Controlled TMJ and using mouth monroe and compliant   Managing her stress despite pandemic    She will call if any worsening headaches  or increase in frequency   Will monitor          Relevant Medications    cyclobenzaprine (FLEXERIL) 5 mg tablet       Respiratory    TIMO on CPAP (Chronic)    Current Assessment & Plan     Dx moderate sleep apnea  11/20 [facility] sleep [facility]  will request old records for review  Not compliant with cpap to day started 11/20  Needs to followup  Provided her info regarding [facility] Dr Posada if wants to transition her care  Fatigue improved          Relevant Orders    Ambulatory referral to Sleep Medicine       Circulatory    Essential hypertension (Chronic)    Current Assessment & Plan     bp  elevated today  Did not take norvasc 5mg one po qdaily today   Dash diet reviewed   Limited caffeine  Asymptomatic  Reviewed compliance with Norvasc 5mg one po qdaily   Tolerating without any side effects  Has home bp monitor and will start monitoring and call if any values over 140 and 85.   rtoq 1m               Infectious/Inflammatory    Tinea pedis of right foot (Chronic)    Current Assessment & Plan     Right interdigital space between  first and second toe  Will rx lotrisone  creme apply bid   She will call if any persistent rash          Relevant Medications    clotrimazole-betamethasone (LOTRISONE) 1-0.05 % cream       Other    Seasonal allergies (Chronic)    Current Assessment & Plan     Stable   Using claritin otc and stable   Will call if any pnd or nasal congestion         Elevated testosterone level (Chronic)    Current Assessment & Plan     screening testosterone level 252.9 1/20  Nl free testo 2.6  No followup  Will refer to dr Mosley for followup            Relevant Orders    Ambulatory referral to Endocrinology    Elevated TSH (Chronic)    Current Assessment & Plan     screening tsh 6.170 1/20  Nl free t4   Will recheck labs and follow          Encounter for well adult exam without abnormal findings - Primary    Relevant Orders    Lipid panel      Other Visit Diagnoses     Screening, lipid        Screening for human immunodeficiency virus        Relevant Orders    HIV 1,2 AB P24 AG    Need for hepatitis C screening test        Relevant Orders    Hepatitis C antibody    Fatigue, unspecified type        Relevant Orders    Comprehensive metabolic panel    CBC and differential    TSH    T4, free          Return in about 1 month (around 5/19/2021).    Orders Placed This Encounter   Procedures   • Comprehensive metabolic panel     Standing Status:   Future     Number of Occurrences:   1     Standing Expiration Date:   4/19/2022     Order Specific Question:   Release to patient     Answer:   Immediate   •  CBC and differential     Standing Status:   Future     Number of Occurrences:   1     Standing Expiration Date:   4/19/2022     Order Specific Question:   Release to patient     Answer:   Immediate   • Lipid panel     Standing Status:   Future     Number of Occurrences:   1     Standing Expiration Date:   4/19/2022     Order Specific Question:   Release to patient     Answer:   Immediate   • TSH     Standing Status:   Future     Number of Occurrences:   1     Standing Expiration Date:   4/19/2022     Order Specific Question:   Release to patient     Answer:   Immediate   • T4, free     Standing Status:   Future     Number of Occurrences:   1     Standing Expiration Date:   4/19/2022     Order Specific Question:   Release to patient     Answer:   Immediate   • HIV 1,2 AB P24 AG     Standing Status:   Future     Number of Occurrences:   1     Standing Expiration Date:   4/19/2022     Order Specific Question:   Release to patient     Answer:   Manual release only   • Hepatitis C antibody     Standing Status:   Future     Number of Occurrences:   1     Standing Expiration Date:   4/19/2022     Order Specific Question:   Release to patient     Answer:   Immediate   • Ambulatory referral to Sleep Medicine     If you are ordering a Sleep Study Procedure please place order   SLE3 -  POLYSOMNOGRAM or a similar appropriate sleep testing order.      Standing Status:   Future     Standing Expiration Date:   10/19/2021     Referral Priority:   Routine     Referral Type:   Consultation     Referral Reason:   Specialty Services Required     Number of Visits Requested:   10   • Ambulatory referral to Endocrinology     Standing Status:   Future     Standing Expiration Date:   10/19/2021     Referral Priority:   Routine     Referral Type:   Consultation     Referral Reason:   Specialty Services Required     Referred to Provider:   Bryant Mosley MD     Requested Specialty:   Endocrinology     Number of Visits Requested:   10            Ramone Milligan, DO  4/19/2021

## 2021-04-19 NOTE — PATIENT INSTRUCTIONS
Patient Education     Health Maintenance, Female  Adopting a healthy lifestyle and getting preventive care are important in promoting health and wellness. Ask your health care provider about:  · The right schedule for you to have regular tests and exams.  · Things you can do on your own to prevent diseases and keep yourself healthy.  What should I know about diet, weight, and exercise?  Eat a healthy diet    · Eat a diet that includes plenty of vegetables, fruits, low-fat dairy products, and lean protein.  · Do not eat a lot of foods that are high in solid fats, added sugars, or sodium.  Maintain a healthy weight  Body mass index (BMI) is used to identify weight problems. It estimates body fat based on height and weight. Your health care provider can help determine your BMI and help you achieve or maintain a healthy weight.  Get regular exercise  Get regular exercise. This is one of the most important things you can do for your health. Most adults should:  · Exercise for at least 150 minutes each week. The exercise should increase your heart rate and make you sweat (moderate-intensity exercise).  · Do strengthening exercises at least twice a week. This is in addition to the moderate-intensity exercise.  · Spend less time sitting. Even light physical activity can be beneficial.  Watch cholesterol and blood lipids  Have your blood tested for lipids and cholesterol at 20 years of age, then have this test every 5 years.  Have your cholesterol levels checked more often if:  · Your lipid or cholesterol levels are high.  · You are older than 40 years of age.  · You are at high risk for heart disease.  What should I know about cancer screening?  Depending on your health history and family history, you may need to have cancer screening at various ages. This may include screening for:  · Breast cancer.  · Cervical cancer.  · Colorectal cancer.  · Skin cancer.  · Lung cancer.  What should I know about heart disease,  diabetes, and high blood pressure?  Blood pressure and heart disease  · High blood pressure causes heart disease and increases the risk of stroke. This is more likely to develop in people who have high blood pressure readings, are of  descent, or are overweight.  · Have your blood pressure checked:  ? Every 3-5 years if you are 18-39 years of age.  ? Every year if you are 40 years old or older.  Diabetes  Have regular diabetes screenings. This checks your fasting blood sugar level. Have the screening done:  · Once every three years after age 40 if you are at a normal weight and have a low risk for diabetes.  · More often and at a younger age if you are overweight or have a high risk for diabetes.  What should I know about preventing infection?  Hepatitis B  If you have a higher risk for hepatitis B, you should be screened for this virus. Talk with your health care provider to find out if you are at risk for hepatitis B infection.  Hepatitis C  Testing is recommended for:  · Everyone born from 1945 through 1965.  · Anyone with known risk factors for hepatitis C.  Sexually transmitted infections (STIs)  · Get screened for STIs, including gonorrhea and chlamydia, if:  ? You are sexually active and are younger than 24 years of age.  ? You are older than 24 years of age and your health care provider tells you that you are at risk for this type of infection.  ? Your sexual activity has changed since you were last screened, and you are at increased risk for chlamydia or gonorrhea. Ask your health care provider if you are at risk.  · Ask your health care provider about whether you are at high risk for HIV. Your health care provider may recommend a prescription medicine to help prevent HIV infection. If you choose to take medicine to prevent HIV, you should first get tested for HIV. You should then be tested every 3 months for as long as you are taking the medicine.  Pregnancy  · If you are about to stop having your  period (premenopausal) and you may become pregnant, seek counseling before you get pregnant.  · Take 400 to 800 micrograms (mcg) of folic acid every day if you become pregnant.  · Ask for birth control (contraception) if you want to prevent pregnancy.  Osteoporosis and menopause  Osteoporosis is a disease in which the bones lose minerals and strength with aging. This can result in bone fractures. If you are 65 years old or older, or if you are at risk for osteoporosis and fractures, ask your health care provider if you should:  · Be screened for bone loss.  · Take a calcium or vitamin D supplement to lower your risk of fractures.  · Be given hormone replacement therapy (HRT) to treat symptoms of menopause.  Follow these instructions at home:  Lifestyle  · Do not use any products that contain nicotine or tobacco, such as cigarettes, e-cigarettes, and chewing tobacco. If you need help quitting, ask your health care provider.  · Do not use street drugs.  · Do not share needles.  · Ask your health care provider for help if you need support or information about quitting drugs.  Alcohol use  · Do not drink alcohol if:  ? Your health care provider tells you not to drink.  ? You are pregnant, may be pregnant, or are planning to become pregnant.  · If you drink alcohol:  ? Limit how much you use to 0-1 drink a day.  ? Limit intake if you are breastfeeding.  · Be aware of how much alcohol is in your drink. In the [place] one drink equals one 12 oz bottle of beer (355 mL), one 5 oz glass of wine (148 mL), or one 1½ oz glass of hard liquor (44 mL).  General instructions  · Schedule regular health, dental, and eye exams.  · Stay current with your vaccines.  · Tell your health care provider if:  ? You often feel depressed.  ? You have ever been abused or do not feel safe at home.  Summary  · Adopting a healthy lifestyle and getting preventive care are important in promoting health and wellness.  · Follow your health care provider's  instructions about healthy diet, exercising, and getting tested or screened for diseases.  · Follow your health care provider's instructions on monitoring your cholesterol and blood pressure.  This information is not intended to replace advice given to you by your health care provider. Make sure you discuss any questions you have with your health care provider.  Document Released: 07/02/2012 Document Revised: 12/11/2019 Document Reviewed: 12/11/2019  [employer] Patient Education © 2020 [employer]       Patient Education     Preventive Care 40-64 Years Old, Female  Preventive care refers to visits with your health care provider and lifestyle choices that can promote health and wellness. This includes:  · A yearly physical exam. This may also be called an annual well check.  · Regular dental visits and eye exams.  · Immunizations.  · Screening for certain conditions.  · Healthy lifestyle choices, such as eating a healthy diet, getting regular exercise, not using drugs or products that contain nicotine and tobacco, and limiting alcohol use.  What can I expect for my preventive care visit?  Physical exam  Your health care provider will check your:  · Height and weight. This may be used to calculate body mass index (BMI), which tells if you are at a healthy weight.  · Heart rate and blood pressure.  · Skin for abnormal spots.  Counseling  Your health care provider may ask you questions about your:  · Alcohol, tobacco, and drug use.  · Emotional well-being.  · Home and relationship well-being.  · Sexual activity.  · Eating habits.  · Work and work environment.  · Method of birth control.  · Menstrual cycle.  · Pregnancy history.  What immunizations do I need?    Influenza (flu) vaccine  · This is recommended every year.  Tetanus, diphtheria, and pertussis (Tdap) vaccine  · You may need a Td booster every 10 years.  Varicella (chickenpox) vaccine  · You may need this if you have not been vaccinated.  Zoster (shingles)  vaccine  · You may need this after age 60.  Measles, mumps, and rubella (MMR) vaccine  · You may need at least one dose of MMR if you were born in 1957 or later. You may also need a second dose.  Pneumococcal conjugate (PCV13) vaccine  · You may need this if you have certain conditions and were not previously vaccinated.  Pneumococcal polysaccharide (PPSV23) vaccine  · You may need one or two doses if you smoke cigarettes or if you have certain conditions.  Meningococcal conjugate (MenACWY) vaccine  · You may need this if you have certain conditions.  Hepatitis A vaccine  · You may need this if you have certain conditions or if you travel or work in places where you may be exposed to hepatitis A.  Hepatitis B vaccine  · You may need this if you have certain conditions or if you travel or work in places where you may be exposed to hepatitis B.  Haemophilus influenzae type b (Hib) vaccine  · You may need this if you have certain conditions.  Human papillomavirus (HPV) vaccine  · If recommended by your health care provider, you may need three doses over 6 months.  You may receive vaccines as individual doses or as more than one vaccine together in one shot (combination vaccines). Talk with your health care provider about the risks and benefits of combination vaccines.  What tests do I need?  Blood tests  · Lipid and cholesterol levels. These may be checked every 5 years, or more frequently if you are over 50 years old.  · Hepatitis C test.  · Hepatitis B test.  Screening  · Lung cancer screening. You may have this screening every year starting at age 55 if you have a 30-pack-year history of smoking and currently smoke or have quit within the past 15 years.  · Colorectal cancer screening. All adults should have this screening starting at age 50 and continuing until age 75. Your health care provider may recommend screening at age 45 if you are at increased risk. You will have tests every 1-10 years, depending on your  results and the type of screening test.  · Diabetes screening. This is done by checking your blood sugar (glucose) after you have not eaten for a while (fasting). You may have this done every 1-3 years.  · Mammogram. This may be done every 1-2 years. Talk with your health care provider about when you should start having regular mammograms. This may depend on whether you have a family history of breast cancer.  · BRCA-related cancer screening. This may be done if you have a family history of breast, ovarian, tubal, or peritoneal cancers.  · Pelvic exam and Pap test. This may be done every 3 years starting at age 21. Starting at age 30, this may be done every 5 years if you have a Pap test in combination with an HPV test.  Other tests  · Sexually transmitted disease (STD) testing.  · Bone density scan. This is done to screen for osteoporosis. You may have this scan if you are at high risk for osteoporosis.  Follow these instructions at home:  Eating and drinking  · Eat a diet that includes fresh fruits and vegetables, whole grains, lean protein, and low-fat dairy.  · Take vitamin and mineral supplements as recommended by your health care provider.  · Do not drink alcohol if:  ? Your health care provider tells you not to drink.  ? You are pregnant, may be pregnant, or are planning to become pregnant.  · If you drink alcohol:  ? Limit how much you have to 0-1 drink a day.  ? Be aware of how much alcohol is in your drink. In the [place] one drink equals one 12 oz bottle of beer (355 mL), one 5 oz glass of wine (148 mL), or one 1½ oz glass of hard liquor (44 mL).  Lifestyle  · Take daily care of your teeth and gums.  · Stay active. Exercise for at least 30 minutes on 5 or more days each week.  · Do not use any products that contain nicotine or tobacco, such as cigarettes, e-cigarettes, and chewing tobacco. If you need help quitting, ask your health care provider.  · If you are sexually active, practice safe sex. Use a  "condom or other form of birth control (contraception) in order to prevent pregnancy and STIs (sexually transmitted infections).  · If told by your health care provider, take low-dose aspirin daily starting at age 50.  What's next?  · Visit your health care provider once a year for a well check visit.  · Ask your health care provider how often you should have your eyes and teeth checked.  · Stay up to date on all vaccines.  This information is not intended to replace advice given to you by your health care provider. Make sure you discuss any questions you have with your health care provider.  Document Released: 01/13/2017 Document Revised: 08/29/2019 Document Reviewed: 08/29/2019  [employer] Patient Education © 2020 [employer]       Patient Education     Hypertension, Adult  High blood pressure (hypertension) is when the force of blood pumping through the arteries is too strong. The arteries are the blood vessels that carry blood from the heart throughout the body. Hypertension forces the heart to work harder to pump blood and may cause arteries to become narrow or stiff. Untreated or uncontrolled hypertension can cause a heart attack, heart failure, a stroke, kidney disease, and other problems.  A blood pressure reading consists of a higher number over a lower number. Ideally, your blood pressure should be below 120/80. The first (\"top\") number is called the systolic pressure. It is a measure of the pressure in your arteries as your heart beats. The second (\"bottom\") number is called the diastolic pressure. It is a measure of the pressure in your arteries as the heart relaxes.  What are the causes?  The exact cause of this condition is not known. There are some conditions that result in or are related to high blood pressure.  What increases the risk?  Some risk factors for high blood pressure are under your control. The following factors may make you more likely to develop this condition:  · Smoking.  · Having type " 2 diabetes mellitus, high cholesterol, or both.  · Not getting enough exercise or physical activity.  · Being overweight.  · Having too much fat, sugar, calories, or salt (sodium) in your diet.  · Drinking too much alcohol.  Some risk factors for high blood pressure may be difficult or impossible to change. Some of these factors include:  · Having chronic kidney disease.  · Having a family history of high blood pressure.  · Age. Risk increases with age.  · Race. You may be at higher risk if you are .  · Gender. Men are at higher risk than women before age 45. After age 65, women are at higher risk than men.  · Having obstructive sleep apnea.  · Stress.  What are the signs or symptoms?  High blood pressure may not cause symptoms. Very high blood pressure (hypertensive crisis) may cause:  · Headache.  · Anxiety.  · Shortness of breath.  · Nosebleed.  · Nausea and vomiting.  · Vision changes.  · Severe chest pain.  · Seizures.  How is this diagnosed?  This condition is diagnosed by measuring your blood pressure while you are seated, with your arm resting on a flat surface, your legs uncrossed, and your feet flat on the floor. The cuff of the blood pressure monitor will be placed directly against the skin of your upper arm at the level of your heart. It should be measured at least twice using the same arm. Certain conditions can cause a difference in blood pressure between your right and left arms.  Certain factors can cause blood pressure readings to be lower or higher than normal for a short period of time:  · When your blood pressure is higher when you are in a health care provider's office than when you are at home, this is called white coat hypertension. Most people with this condition do not need medicines.  · When your blood pressure is higher at home than when you are in a health care provider's office, this is called masked hypertension. Most people with this condition may need medicines to  control blood pressure.  If you have a high blood pressure reading during one visit or you have normal blood pressure with other risk factors, you may be asked to:  · Return on a different day to have your blood pressure checked again.  · Monitor your blood pressure at home for 1 week or longer.  If you are diagnosed with hypertension, you may have other blood or imaging tests to help your health care provider understand your overall risk for other conditions.  How is this treated?  This condition is treated by making healthy lifestyle changes, such as eating healthy foods, exercising more, and reducing your alcohol intake. Your health care provider may prescribe medicine if lifestyle changes are not enough to get your blood pressure under control, and if:  · Your systolic blood pressure is above 130.  · Your diastolic blood pressure is above 80.  Your personal target blood pressure may vary depending on your medical conditions, your age, and other factors.  Follow these instructions at home:  Eating and drinking    · Eat a diet that is high in fiber and potassium, and low in sodium, added sugar, and fat. An example eating plan is called the DASH (Dietary Approaches to Stop Hypertension) diet. To eat this way:  ? Eat plenty of fresh fruits and vegetables. Try to fill one half of your plate at each meal with fruits and vegetables.  ? Eat whole grains, such as whole-wheat pasta, brown rice, or whole-grain bread. Fill about one fourth of your plate with whole grains.  ? Eat or drink low-fat dairy products, such as skim milk or low-fat yogurt.  ? Avoid fatty cuts of meat, processed or cured meats, and poultry with skin. Fill about one fourth of your plate with lean proteins, such as fish, chicken without skin, beans, eggs, or tofu.  ? Avoid pre-made and processed foods. These tend to be higher in sodium, added sugar, and fat.  · Reduce your daily sodium intake. Most people with hypertension should eat less than 1,500 mg  of sodium a day.  · Do not drink alcohol if:  ? Your health care provider tells you not to drink.  ? You are pregnant, may be pregnant, or are planning to become pregnant.  · If you drink alcohol:  ? Limit how much you use to:  § 0-1 drink a day for women.  § 0-2 drinks a day for men.  ? Be aware of how much alcohol is in your drink. In the [place] one drink equals one 12 oz bottle of beer (355 mL), one 5 oz glass of wine (148 mL), or one 1½ oz glass of hard liquor (44 mL).  Lifestyle    · Work with your health care provider to maintain a healthy body weight or to lose weight. Ask what an ideal weight is for you.  · Get at least 30 minutes of exercise most days of the week. Activities may include walking, swimming, or biking.  · Include exercise to strengthen your muscles (resistance exercise), such as Pilates or lifting weights, as part of your weekly exercise routine. Try to do these types of exercises for 30 minutes at least 3 days a week.  · Do not use any products that contain nicotine or tobacco, such as cigarettes, e-cigarettes, and chewing tobacco. If you need help quitting, ask your health care provider.  · Monitor your blood pressure at home as told by your health care provider.  · Keep all follow-up visits as told by your health care provider. This is important.  Medicines  · Take over-the-counter and prescription medicines only as told by your health care provider. Follow directions carefully. Blood pressure medicines must be taken as prescribed.  · Do not skip doses of blood pressure medicine. Doing this puts you at risk for problems and can make the medicine less effective.  · Ask your health care provider about side effects or reactions to medicines that you should watch for.  Contact a health care provider if you:  · Think you are having a reaction to a medicine you are taking.  · Have headaches that keep coming back (recurring).  · Feel dizzy.  · Have swelling in your ankles.  · Have trouble with your  "vision.  Get help right away if you:  · Develop a severe headache or confusion.  · Have unusual weakness or numbness.  · Feel faint.  · Have severe pain in your chest or abdomen.  · Vomit repeatedly.  · Have trouble breathing.  Summary  · Hypertension is when the force of blood pumping through your arteries is too strong. If this condition is not controlled, it may put you at risk for serious complications.  · Your personal target blood pressure may vary depending on your medical conditions, your age, and other factors. For most people, a normal blood pressure is less than 120/80.  · Hypertension is treated with lifestyle changes, medicines, or a combination of both. Lifestyle changes include losing weight, eating a healthy, low-sodium diet, exercising more, and limiting alcohol.  This information is not intended to replace advice given to you by your health care provider. Make sure you discuss any questions you have with your health care provider.  Document Released: 12/18/2006 Document Revised: 08/28/2019 Document Reviewed: 08/28/2019  [employer] Patient Education © 2020 [employer]       Patient Education     DASH Eating Plan  DASH stands for \"Dietary Approaches to Stop Hypertension.\" The DASH eating plan is a healthy eating plan that has been shown to reduce high blood pressure (hypertension). It may also reduce your risk for type 2 diabetes, heart disease, and stroke. The DASH eating plan may also help with weight loss.  What are tips for following this plan?    General guidelines  · Avoid eating more than 2,300 mg (milligrams) of salt (sodium) a day. If you have hypertension, you may need to reduce your sodium intake to 1,500 mg a day.  · Limit alcohol intake to no more than 1 drink a day for nonpregnant women and 2 drinks a day for men. One drink equals 12 oz of beer, 5 oz of wine, or 1½ oz of hard liquor.  · Work with your health care provider to maintain a healthy body weight or to lose weight. Ask what an " "ideal weight is for you.  · Get at least 30 minutes of exercise that causes your heart to beat faster (aerobic exercise) most days of the week. Activities may include walking, swimming, or biking.  · Work with your health care provider or diet and nutrition specialist (dietitian) to adjust your eating plan to your individual calorie needs.  Reading food labels    · Check food labels for the amount of sodium per serving. Choose foods with less than 5 percent of the Daily Value of sodium. Generally, foods with less than 300 mg of sodium per serving fit into this eating plan.  · To find whole grains, look for the word \"whole\" as the first word in the ingredient list.  Shopping  · Buy products labeled as \"low-sodium\" or \"no salt added.\"  · Buy fresh foods. Avoid canned foods and premade or frozen meals.  Cooking  · Avoid adding salt when cooking. Use salt-free seasonings or herbs instead of table salt or sea salt. Check with your health care provider or pharmacist before using salt substitutes.  · Do not  foods. Cook foods using healthy methods such as baking, boiling, grilling, and broiling instead.  · Cook with heart-healthy oils, such as olive, canola, soybean, or sunflower oil.  Meal planning  · Eat a balanced diet that includes:  ? 5 or more servings of fruits and vegetables each day. At each meal, try to fill half of your plate with fruits and vegetables.  ? Up to 6-8 servings of whole grains each day.  ? Less than 6 oz of lean meat, poultry, or fish each day. A 3-oz serving of meat is about the same size as a deck of cards. One egg equals 1 oz.  ? 2 servings of low-fat dairy each day.  ? A serving of nuts, seeds, or beans 5 times each week.  ? Heart-healthy fats. Healthy fats called Omega-3 fatty acids are found in foods such as flaxseeds and coldwater fish, like sardines, salmon, and mackerel.  · Limit how much you eat of the following:  ? Canned or prepackaged foods.  ? Food that is high in trans fat, such " as fried foods.  ? Food that is high in saturated fat, such as fatty meat.  ? Sweets, desserts, sugary drinks, and other foods with added sugar.  ? Full-fat dairy products.  · Do not salt foods before eating.  · Try to eat at least 2 vegetarian meals each week.  · Eat more home-cooked food and less restaurant, buffet, and fast food.  · When eating at a restaurant, ask that your food be prepared with less salt or no salt, if possible.  What foods are recommended?  The items listed may not be a complete list. Talk with your dietitian about what dietary choices are best for you.  Grains  Whole-grain or whole-wheat bread. Whole-grain or whole-wheat pasta. Brown rice. Oatmeal. Quinoa. Bulgur. Whole-grain and low-sodium cereals. Mackenzie bread. Low-fat, low-sodium crackers. Whole-wheat flour tortillas.  Vegetables  Fresh or frozen vegetables (raw, steamed, roasted, or grilled). Low-sodium or reduced-sodium tomato and vegetable juice. Low-sodium or reduced-sodium tomato sauce and tomato paste. Low-sodium or reduced-sodium canned vegetables.  Fruits  All fresh, dried, or frozen fruit. Canned fruit in natural juice (without added sugar).  Meat and other protein foods  Skinless chicken or turkey. Ground chicken or turkey. Pork with fat trimmed off. Fish and seafood. Egg whites. Dried beans, peas, or lentils. Unsalted nuts, nut butters, and seeds. Unsalted canned beans. Lean cuts of beef with fat trimmed off. Low-sodium, lean deli meat.  Dairy  Low-fat (1%) or fat-free (skim) milk. Fat-free, low-fat, or reduced-fat cheeses. Nonfat, low-sodium ricotta or cottage cheese. Low-fat or nonfat yogurt. Low-fat, low-sodium cheese.  Fats and oils  Soft margarine without trans fats. Vegetable oil. Low-fat, reduced-fat, or light mayonnaise and salad dressings (reduced-sodium). Canola, safflower, olive, soybean, and sunflower oils. Avocado.  Seasoning and other foods  Herbs. Spices. Seasoning mixes without salt. Unsalted popcorn and pretzels.  Fat-free sweets.  What foods are not recommended?  The items listed may not be a complete list. Talk with your dietitian about what dietary choices are best for you.  Grains  Baked goods made with fat, such as croissants, muffins, or some breads. Dry pasta or rice meal packs.  Vegetables  Creamed or fried vegetables. Vegetables in a cheese sauce. Regular canned vegetables (not low-sodium or reduced-sodium). Regular canned tomato sauce and paste (not low-sodium or reduced-sodium). Regular tomato and vegetable juice (not low-sodium or reduced-sodium). Pickles. Olives.  Fruits  Canned fruit in a light or heavy syrup. Fried fruit. Fruit in cream or butter sauce.  Meat and other protein foods  Fatty cuts of meat. Ribs. Fried meat. Pérez. Sausage. Bologna and other processed lunch meats. Salami. Fatback. Hotdogs. Bratwurst. Salted nuts and seeds. Canned beans with added salt. Canned or smoked fish. Whole eggs or egg yolks. Chicken or turkey with skin.  Dairy  Whole or 2% milk, cream, and half-and-half. Whole or full-fat cream cheese. Whole-fat or sweetened yogurt. Full-fat cheese. Nondairy creamers. Whipped toppings. Processed cheese and cheese spreads.  Fats and oils  Butter. Stick margarine. Lard. Shortening. Ghee. Pérez fat. Tropical oils, such as coconut, palm kernel, or palm oil.  Seasoning and other foods  Salted popcorn and pretzels. Onion salt, garlic salt, seasoned salt, table salt, and sea salt. Worcestershire sauce. Tartar sauce. Barbecue sauce. Teriyaki sauce. Soy sauce, including reduced-sodium. Steak sauce. Canned and packaged gravies. Fish sauce. Oyster sauce. Cocktail sauce. Horseradish that you find on the shelf. Ketchup. Mustard. Meat flavorings and tenderizers. Bouillon cubes. Hot sauce and Tabasco sauce. Premade or packaged marinades. Premade or packaged taco seasonings. Relishes. Regular salad dressings.  Where to find more information:  · National Heart, Lung, and Blood [place]:  www.nhlbi.nih.gov  · American Heart Association: www.heart.org  Summary  · The DASH eating plan is a healthy eating plan that has been shown to reduce high blood pressure (hypertension). It may also reduce your risk for type 2 diabetes, heart disease, and stroke.  · With the DASH eating plan, you should limit salt (sodium) intake to 2,300 mg a day. If you have hypertension, you may need to reduce your sodium intake to 1,500 mg a day.  · When on the DASH eating plan, aim to eat more fresh fruits and vegetables, whole grains, lean proteins, low-fat dairy, and heart-healthy fats.  · Work with your health care provider or diet and nutrition specialist (dietitian) to adjust your eating plan to your individual calorie needs.  This information is not intended to replace advice given to you by your health care provider. Make sure you discuss any questions you have with your health care provider.  Document Released: 12/06/2012 Document Revised: 11/30/2018 Document Reviewed: 12/11/2017  [employer] Patient Education © 2020 [employer]       Patient Education     Migraine Headache  A migraine headache is an intense, throbbing pain on one side or both sides of the head. Migraine headaches may also cause other symptoms, such as nausea, vomiting, and sensitivity to light and noise. A migraine headache can last from 4 hours to 3 days. Talk with your doctor about what things may bring on (trigger) your migraine headaches.  What are the causes?  The exact cause of this condition is not known. However, a migraine may be caused when nerves in the brain become irritated and release chemicals that cause inflammation of blood vessels. This inflammation causes pain. This condition may be triggered or caused by:  · Drinking alcohol.  · Smoking.  · Taking medicines, such as:  ? Medicine used to treat chest pain (nitroglycerin).  ? Birth control pills.  ? Estrogen.  ? Certain blood pressure medicines.  · Eating or drinking products that  contain nitrates, glutamate, aspartame, or tyramine. Aged cheeses, chocolate, or caffeine may also be triggers.  · Doing physical activity.  Other things that may trigger a migraine headache include:  · Menstruation.  · Pregnancy.  · Hunger.  · Stress.  · Lack of sleep or too much sleep.  · Weather changes.  · Fatigue.  What increases the risk?  The following factors may make you more likely to experience migraine headaches:  · Being a certain age. This condition is more common in people who are 25-55 years old.  · Being female.  · Having a family history of migraine headaches.  · Being .  · Having a mental health condition, such as depression or anxiety.  · Being obese.  What are the signs or symptoms?  The main symptom of this condition is pulsating or throbbing pain. This pain may:  · Happen in any area of the head, such as on one side or both sides.  · Interfere with daily activities.  · Get worse with physical activity.  · Get worse with exposure to bright lights or loud noises.  Other symptoms may include:  · Nausea.  · Vomiting.  · Dizziness.  · General sensitivity to bright lights, loud noises, or smells.  Before you get a migraine headache, you may get warning signs (an aura). An aura may include:  · Seeing flashing lights or having blind spots.  · Seeing bright spots, halos, or zigzag lines.  · Having tunnel vision or blurred vision.  · Having numbness or a tingling feeling.  · Having trouble talking.  · Having muscle weakness.  Some people have symptoms after a migraine headache (postdromal phase), such as:  · Feeling tired.  · Difficulty concentrating.  How is this diagnosed?  A migraine headache can be diagnosed based on:  · Your symptoms.  · A physical exam.  · Tests, such as:  ? CT scan or an MRI of the head. These imaging tests can help rule out other causes of headaches.  ? Taking fluid from the spine (lumbar puncture) and analyzing it (cerebrospinal fluid analysis, or CSF analysis).  How  is this treated?  This condition may be treated with medicines that:  · Relieve pain.  · Relieve nausea.  · Prevent migraine headaches.  Treatment for this condition may also include:  · Acupuncture.  · Lifestyle changes like avoiding foods that trigger migraine headaches.  · Biofeedback.  · Cognitive behavioral therapy.  Follow these instructions at home:  Medicines  · Take over-the-counter and prescription medicines only as told by your health care provider.  · Ask your health care provider if the medicine prescribed to you:  ? Requires you to avoid driving or using heavy machinery.  ? Can cause constipation. You may need to take these actions to prevent or treat constipation:  § Drink enough fluid to keep your urine pale yellow.  § Take over-the-counter or prescription medicines.  § Eat foods that are high in fiber, such as beans, whole grains, and fresh fruits and vegetables.  § Limit foods that are high in fat and processed sugars, such as fried or sweet foods.  Lifestyle  · Do not drink alcohol.  · Do not use any products that contain nicotine or tobacco, such as cigarettes, e-cigarettes, and chewing tobacco. If you need help quitting, ask your health care provider.  · Get at least 8 hours of sleep every night.  · Find ways to manage stress, such as meditation, deep breathing, or yoga.  General instructions         · Keep a journal to find out what may trigger your migraine headaches. For example, write down:  ? What you eat and drink.  ? How much sleep you get.  ? Any change to your diet or medicines.  · If you have a migraine headache:  ? Avoid things that make your symptoms worse, such as bright lights.  ? It may help to lie down in a dark, quiet room.  ? Do not drive or use heavy machinery.  ? Ask your health care provider what activities are safe for you while you are experiencing symptoms.  · Keep all follow-up visits as told by your health care provider. This is important.  Contact a health care  provider if:  · You develop symptoms that are different or more severe than your usual migraine headache symptoms.  · You have more than 15 headache days in one month.  Get help right away if:  · Your migraine headache becomes severe.  · Your migraine headache lasts longer than 72 hours.  · You have a fever.  · You have a stiff neck.  · You have vision loss.  · Your muscles feel weak or like you cannot control them.  · You start to lose your balance often.  · You have trouble walking.  · You faint.  · You have a seizure.  Summary  · A migraine headache is an intense, throbbing pain on one side or both sides of the head. Migraines may also cause other symptoms, such as nausea, vomiting, and sensitivity to light and noise.  · This condition may be treated with medicines and lifestyle changes. You may also need to avoid certain things that trigger a migraine headache.  · Keep a journal to find out what may trigger your migraine headaches.  · Contact your health care provider if you have more than 15 headache days in a month or you develop symptoms that are different or more severe than your usual migraine headache symptoms.  This information is not intended to replace advice given to you by your health care provider. Make sure you discuss any questions you have with your health care provider.  Document Released: 12/18/2006 Document Revised: 04/10/2020 Document Reviewed: 01/30/2020  [employer] Patient Education © 2020 [employer]       Patient Education     Living With Sleep Apnea  Sleep apnea is a condition in which breathing pauses or becomes shallow during sleep. Sleep apnea is most commonly caused by a collapsed or blocked airway. People with sleep apnea snore loudly and have times when they gasp and stop breathing for 10 seconds or more during sleep. This happens over and over during the night. This disrupts your sleep and keeps your body from getting the rest that it needs, which can cause tiredness and lack of  energy (fatigue) during the day.  The breaks in breathing also interrupt the deep sleep that you need to feel rested. Even if you do not completely wake up from the gaps in breathing, your sleep may not be restful. You may also have a headache in the morning and low energy during the day, and you may feel anxious or depressed.  How can sleep apnea affect me?  Sleep apnea increases your chances of extreme tiredness during the day (daytime fatigue). It can also increase your risk for health conditions, such as:  · Heart attack.  · Stroke.  · Diabetes.  · Heart failure.  · Irregular heartbeat.  · High blood pressure.  If you have daytime fatigue as a result of sleep apnea, you may be more likely to:  · Perform poorly at school or work.  · Fall asleep while driving.  · Have difficulty with attention.  · Develop depression or anxiety.  · Become severely overweight (obese).  · Have sexual dysfunction.  What actions can I take to manage sleep apnea?  Sleep apnea treatment    · If you were given a device to open your airway while you sleep, use it only as told by your health care provider. You may be given:  ? An oral appliance. This is a custom-made mouthpiece that shifts your lower jaw forward.  ? A continuous positive airway pressure (CPAP) device. This device blows air through a mask when you breathe out (exhale).  ? A nasal expiratory positive airway pressure (EPAP) device. This device has valves that you put into each nostril.  ? A bi-level positive airway pressure (BPAP) device. This device blows air through a mask when you breathe in (inhale) and breathe out (exhale).  · You may need surgery if other treatments do not work for you.  Sleep habits  · Go to sleep and wake up at the same time every day. This helps set your internal clock (circadian rhythm) for sleeping.  ? If you stay up later than usual, such as on weekends, try to get up in the morning within 2 hours of your normal wake time.  · Try to get at least  7-9 hours of sleep each night.  · Stop computer, tablet, and mobile phone use a few hours before bedtime.  · Do not take long naps during the day. If you nap, limit it to 30 minutes.  · Have a relaxing bedtime routine. Reading or listening to music may relax you and help you sleep.  · Use your bedroom only for sleep.  ? Keep your television and computer out of your bedroom.  ? Keep your bedroom cool, dark, and quiet.  ? Use a supportive mattress and pillows.  · Follow your health care provider's instructions for other changes to sleep habits.  Nutrition  · Do not eat heavy meals in the evening.  · Do not have caffeine in the later part of the day. The effects of caffeine can last for more than 5 hours.  · Follow your health care provider's or dietitian's instructions for any diet changes.  Lifestyle         · Do not drink alcohol before bedtime. Alcohol can cause you to fall asleep at first, but then it can cause you to wake up in the middle of the night and have trouble getting back to sleep.  · Do not use any products that contain nicotine or tobacco, such as cigarettes and e-cigarettes. If you need help quitting, ask your health care provider.  Medicines  · Take over-the-counter and prescription medicines only as told by your health care provider.  · Do not use over-the-counter sleep medicine. You can become dependent on this medicine, and it can make sleep apnea worse.  · Do not use medicines, such as sedatives and narcotics, unless told by your health care provider.  Activity  · Exercise on most days, but avoid exercising in the evening. Exercising near bedtime can interfere with sleeping.  · If possible, spend time outside every day. Natural light helps regulate your circadian rhythm.  General information  · Lose weight if you need to, and maintain a healthy weight.  · Keep all follow-up visits as told by your health care provider. This is important.  · If you are having surgery, make sure to tell your health  care provider that you have sleep apnea. You may need to bring your device with you.  Where to find more information  Learn more about sleep apnea and daytime fatigue from:  · American Sleep Association: sleepassociation.org  · National Sleep Foundation: sleepfoundation.org  · National Heart, Lung, and Blood [place]: nhlbi.nih.gov  Summary  · Sleep apnea can cause daytime fatigue and other serious health conditions.  · Both sleep apnea and daytime fatigue can be bad for your health and well-being.  · You may need to wear a device while sleeping to help keep your airway open.  · If you are having surgery, make sure to tell your health care provider that you have sleep apnea. You may need to bring your device with you.  · Making changes to sleep habits, diet, lifestyle, and activity can help you manage sleep apnea.  This information is not intended to replace advice given to you by your health care provider. Make sure you discuss any questions you have with your health care provider.  Document Released: 03/14/2019 Document Revised: 04/10/2020 Document Reviewed: 03/14/2019  [employer] Patient Education © 2020 [employer]       Patient Education   Smoking Cessation, Tips for Success  If you are ready to quit smoking, congratulations! You have chosen to help yourself be healthier. Cigarettes bring nicotine, tar, carbon monoxide, and other irritants into your body. Your lungs, heart, and blood vessels will be able to work better without these poisons. There are many different ways to quit smoking. Nicotine gum, nicotine patches, a nicotine inhaler, or nicotine nasal spray can help with physical craving. Hypnosis, support groups, and medicines help break the habit of smoking.  WHAT THINGS CAN I DO TO MAKE QUITTING EASIER?   Here are some tips to help you quit for good:  · Pick a date when you will quit smoking completely. Tell all of your friends and family about your plan to quit on that date.  · Do not try to slowly  "cut down on the number of cigarettes you are smoking. Pick a quit date and quit smoking completely starting on that day.  · Throw away all cigarettes.    · Clean and remove all ashtrays from your home, work, and car.  · On a card, write down your reasons for quitting. Carry the card with you and read it when you get the urge to smoke.  · Cleanse your body of nicotine. Drink enough water and fluids to keep your urine clear or pale yellow. Do this after quitting to flush the nicotine from your body.  · Learn to predict your moods. Do not let a bad situation be your excuse to have a cigarette. Some situations in your life might tempt you into wanting a cigarette.  · Never have \"just one\" cigarette. It leads to wanting another and another. Remind yourself of your decision to quit.  · Change habits associated with smoking. If you smoked while driving or when feeling stressed, try other activities to replace smoking. Stand up when drinking your coffee. Brush your teeth after eating. Sit in a different chair when you read the paper. Avoid alcohol while trying to quit, and try to drink fewer caffeinated beverages. Alcohol and caffeine may urge you to smoke.  · Avoid foods and drinks that can trigger a desire to smoke, such as sugary or spicy foods and alcohol.  · Ask people who smoke not to smoke around you.  · Have something planned to do right after eating or having a cup of coffee. For example, plan to take a walk or exercise.  · Try a relaxation exercise to calm you down and decrease your stress. Remember, you may be tense and nervous for the first 2 weeks after you quit, but this will pass.  · Find new activities to keep your hands busy. Play with a pen, coin, or rubber band. Doodle or draw things on paper.  · Brush your teeth right after eating. This will help cut down on the craving for the taste of tobacco after meals. You can also try mouthwash.    · Use oral substitutes in place of cigarettes. Try using lemon " "drops, carrots, cinnamon sticks, or chewing gum. Keep them handy so they are available when you have the urge to smoke.  · When you have the urge to smoke, try deep breathing.  · Designate your home as a nonsmoking area.  · If you are a heavy smoker, ask your health care provider about a prescription for nicotine chewing gum. It can ease your withdrawal from nicotine.  · Reward yourself. Set aside the cigarette money you save and buy yourself something nice.  · Look for support from others. Join a support group or smoking cessation program. Ask someone at home or at work to help you with your plan to quit smoking.  · Always ask yourself, \"Do I need this cigarette or is this just a reflex?\" Tell yourself, \"Today, I choose not to smoke,\" or \"I do not want to smoke.\" You are reminding yourself of your decision to quit.  · Do not replace cigarette smoking with electronic cigarettes (commonly called e-cigarettes). The safety of e-cigarettes is unknown, and some may contain harmful chemicals.  · If you relapse, do not give up! Plan ahead and think about what you will do the next time you get the urge to smoke.  HOW WILL I FEEL WHEN I QUIT SMOKING?  You may have symptoms of withdrawal because your body is used to nicotine (the addictive substance in cigarettes). You may crave cigarettes, be irritable, feel very hungry, cough often, get headaches, or have difficulty concentrating. The withdrawal symptoms are only temporary. They are strongest when you first quit but will go away within 10-14 days. When withdrawal symptoms occur, stay in control. Think about your reasons for quitting. Remind yourself that these are signs that your body is healing and getting used to being without cigarettes. Remember that withdrawal symptoms are easier to treat than the major diseases that smoking can cause.   Even after the withdrawal is over, expect periodic urges to smoke. However, these cravings are generally short lived and will go away " whether you smoke or not. Do not smoke!  WHAT RESOURCES ARE AVAILABLE TO HELP ME QUIT SMOKING?  Your health care provider can direct you to community resources or hospitals for support, which may include:  · Group support.  · Education.  · Hypnosis.  · Therapy.     This information is not intended to replace advice given to you by your health care provider. Make sure you discuss any questions you have with your health care provider.     Document Released: 09/15/2005 Document Revised: 01/08/2016 Document Reviewed: 06/05/2014  [employer] Patient Education ©2016 [employer]       Patient Education     Steps to Quit Smoking  Smoking tobacco is the leading cause of preventable death. It can affect almost every organ in the body. Smoking puts you and people around you at risk for many serious, long-lasting (chronic) diseases. Quitting smoking can be hard, but it is one of the best things that you can do for your health. It is never too late to quit.  How do I get ready to quit?  When you decide to quit smoking, make a plan to help you succeed. Before you quit:  · Pick a date to quit. Set a date within the next 2 weeks to give you time to prepare.  · Write down the reasons why you are quitting. Keep this list in places where you will see it often.  · Tell your family, friends, and co-workers that you are quitting. Their support is important.  · Talk with your doctor about the choices that may help you quit.  · Find out if your health insurance will pay for these treatments.  · Know the people, places, things, and activities that make you want to smoke (triggers). Avoid them.  What first steps can I take to quit smoking?  · Throw away all cigarettes at home, at work, and in your car.  · Throw away the things that you use when you smoke, such as ashtrays and lighters.  · Clean your car. Make sure to empty the ashtray.  · Clean your home, including curtains and carpets.  What can I do to help me quit  smoking?  Talk with your doctor about taking medicines and seeing a counselor at the same time. You are more likely to succeed when you do both.  · If you are pregnant or breastfeeding, talk with your doctor about counseling or other ways to quit smoking. Do not take medicine to help you quit smoking unless your doctor tells you to do so.  To quit smoking:  Quit right away  · Quit smoking totally, instead of slowly cutting back on how much you smoke over a period of time.  · Go to counseling. You are more likely to quit if you go to counseling sessions regularly.  Take medicine  You may take medicines to help you quit. Some medicines need a prescription, and some you can buy over-the-counter. Some medicines may contain a drug called nicotine to replace the nicotine in cigarettes. Medicines may:  · Help you to stop having the desire to smoke (cravings).  · Help to stop the problems that come when you stop smoking (withdrawal symptoms).  Your doctor may ask you to use:  · Nicotine patches, gum, or lozenges.  · Nicotine inhalers or sprays.  · Non-nicotine medicine that is taken by mouth.  Find resources  Find resources and other ways to help you quit smoking and remain smoke-free after you quit. These resources are most helpful when you use them often. They include:  · Online chats with a counselor.  · Phone quitlines.  · Printed self-help materials.  · Support groups or group counseling.  · Text messaging programs.  · Mobile phone apps. Use apps on your mobile phone or tablet that can help you stick to your quit plan. There are many free apps for mobile phones and tablets as well as websites. Examples include Quit Guide from the CDC and smokefree.gov    What things can I do to make it easier to quit?    · Talk to your family and friends. Ask them to support and encourage you.  · Call a phone quitline (0-800QUITNOW), reach out to support groups, or work with a counselor.  · Ask people who smoke to not smoke around  you.  · Avoid places that make you want to smoke, such as:  ? Bars.  ? Parties.  ? Smoke-break areas at work.  · Spend time with people who do not smoke.  · Lower the stress in your life. Stress can make you want to smoke. Try these things to help your stress:  ? Getting regular exercise.  ? Doing deep-breathing exercises.  ? Doing yoga.  ? Meditating.  ? Doing a body scan. To do this, close your eyes, focus on one area of your body at a time from head to toe. Notice which parts of your body are tense. Try to relax the muscles in those areas.  How will I feel when I quit smoking?  Day 1 to 3 weeks  Within the first 24 hours, you may start to have some problems that come from quitting tobacco. These problems are very bad 2-3 days after you quit, but they do not often last for more than 2-3 weeks. You may get these symptoms:  · Mood swings.  · Feeling restless, nervous, angry, or annoyed.  · Trouble concentrating.  · Dizziness.  · Strong desire for high-sugar foods and nicotine.  · Weight gain.  · Trouble pooping (constipation).  · Feeling like you may vomit (nausea).  · Coughing or a sore throat.  · Changes in how the medicines that you take for other issues work in your body.  · Depression.  · Trouble sleeping (insomnia).  Week 3 and afterward  After the first 2-3 weeks of quitting, you may start to notice more positive results, such as:  · Better sense of smell and taste.  · Less coughing and sore throat.  · Slower heart rate.  · Lower blood pressure.  · Clearer skin.  · Better breathing.  · Fewer sick days.  Quitting smoking can be hard. Do not give up if you fail the first time. Some people need to try a few times before they succeed. Do your best to stick to your quit plan, and talk with your doctor if you have any questions or concerns.  Summary  · Smoking tobacco is the leading cause of preventable death. Quitting smoking can be hard, but it is one of the best things that you can do for your health.  · When  you decide to quit smoking, make a plan to help you succeed.  · Quit smoking right away, not slowly over a period of time.  · When you start quitting, seek help from your doctor, family, or friends.  This information is not intended to replace advice given to you by your health care provider. Make sure you discuss any questions you have with your health care provider.  Document Released: 10/14/2010 Document Revised: 09/11/2020 Document Reviewed: 03/07/2020  Elsevier Patient Education © 2020 Elsevier Inc.       Patient Education   Allergic Rhinitis, Adult  Allergic rhinitis is an allergic reaction that affects the mucous membrane inside the nose. It causes sneezing, a runny or stuffy nose, and the feeling of mucus going down the back of the throat (postnasal drip). Allergic rhinitis can be mild to severe.  There are two types of allergic rhinitis:  · Seasonal. This type is also called hay fever. It happens only during certain seasons.  · Perennial. This type can happen at any time of the year.  What are the causes?  This condition happens when the body's defense system (immune system) responds to certain harmless substances called allergens as though they were germs.   Seasonal allergic rhinitis is triggered by pollen, which can come from grasses, trees, and weeds. Perennial allergic rhinitis may be caused by:  · House dust mites.  · Pet dander.  · Mold spores.  What are the signs or symptoms?  Symptoms of this condition include:  · Sneezing.  · Runny or stuffy nose (nasal congestion).  · Postnasal drip.  · Itchy nose.  · Tearing of the eyes.  · Trouble sleeping.  · Daytime sleepiness.  How is this diagnosed?  This condition may be diagnosed based on:  · Your medical history.  · A physical exam.  · Tests to check for related conditions, such as:  ? Asthma.  ? Pink eye.  ? Ear infection.  ? Upper respiratory infection.  · Tests to find out which allergens trigger your symptoms. These may include skin or blood  tests.  How is this treated?  There is no cure for this condition, but treatment can help control symptoms. Treatment may include:  · Taking medicines that block allergy symptoms, such as antihistamines. Medicine may be given as a shot, nasal spray, or pill.  · Avoiding the allergen.  · Desensitization. This treatment involves getting ongoing shots until your body becomes less sensitive to the allergen. This treatment may be done if other treatments do not help.  · If taking medicine and avoiding the allergen does not work, new, stronger medicines may be prescribed.  Follow these instructions at home:  · Find out what you are allergic to. Common allergens include smoke, dust, and pollen.  · Avoid the things you are allergic to. These are some things you can do to help avoid allergens:  ? Replace carpet with wood, tile, or vinyl isai. Carpet can trap dander and dust.  ? Do not smoke. Do not allow smoking in your home.  ? Change your heating and air conditioning filter at least once a month.  ? During allergy season:  § Keep windows closed as much as possible.  § Plan outdoor activities when pollen counts are lowest. This is usually during the evening hours.  § When coming indoors, change clothing and shower before sitting on furniture or bedding.  · Take over-the-counter and prescription medicines only as told by your health care provider.  · Keep all follow-up visits as told by your health care provider. This is important.  Contact a health care provider if:  · You have a fever.  · You develop a persistent cough.  · You make whistling sounds when you breathe (you wheeze).  · Your symptoms interfere with your normal daily activities.  Get help right away if:  · You have shortness of breath.  Summary  · This condition can be managed by taking medicines as directed and avoiding allergens.  · Contact your health care provider if you develop a persistent cough or fever.  · During allergy season, keep windows closed  as much as possible.  This information is not intended to replace advice given to you by your health care provider. Make sure you discuss any questions you have with your health care provider.  Document Released: 09/12/2002 Document Revised: 11/30/2018 Document Reviewed: 01/25/2018  Elsevier Patient Education © 2020 Elsevier Inc.       Patient Education     Athlete's Foot    Athlete's foot (tinea pedis) is a fungal infection of the skin on your feet. It often occurs on the skin that is between or underneath the toes. It can also occur on the soles of your feet. The infection can spread from person to person (is contagious). It can also spread when a person's bare feet come in contact with the fungus on shower floors or on items such as shoes.  What are the causes?  This condition is caused by a fungus that grows in warm, moist places. You can get athlete's foot by sharing shoes, shower stalls, towels, and wet floors with someone who is infected. Not washing your feet or changing your socks often enough can also lead to athlete's foot.  What increases the risk?  This condition is more likely to develop in:  · Men.  · People who have a weak body defense system (immune system).  · People who have diabetes.  · People who use public showers, such as at a gym.  · People who wear heavy-duty shoes, such as industrial or  shoes.  · Seasons with warm, humid weather.  What are the signs or symptoms?  Symptoms of this condition include:  · Itchy areas between your toes or on the soles of your feet.  · White, flaky, or scaly areas between your toes or on the soles of your feet.  · Very itchy small blisters between your toes or on the soles of your feet.  · Small cuts in your skin. These cuts can become infected.  · Thick or discolored toenails.  How is this diagnosed?  This condition may be diagnosed with a physical exam and a review of your medical history. Your health care provider may also take a skin or toenail  sample to examine under a microscope.  How is this treated?  This condition is treated with antifungal medicines. These may be applied as powders, ointments, or creams. In severe cases, an oral antifungal medicine may be given.  Follow these instructions at home:  Medicines  · Apply or take over-the-counter and prescription medicines only as told by your health care provider.  · Apply your antifungal medicine as told by your health care provider. Do not stop using the antifungal even if your condition improves.  Foot care  · Do not scratch your feet.  · Keep your feet dry:  ? Wear cotton or wool socks. Change your socks every day or if they become wet.  ? Wear shoes that allow air to flow, such as sandals or canvas tennis shoes.  · Wash and dry your feet, including the area between your toes. Also, wash and dry your feet:  ? Every day or as told by your health care provider.  ? After exercising.  General instructions  · Do not let others use towels, shoes, nail clippers, or other personal items that touch your feet.  · Protect your feet by wearing sandals in wet areas, such as locker rooms and shared showers.  · Keep all follow-up visits as told by your health care provider. This is important.  · If you have diabetes, keep your blood sugar under control.  Contact a health care provider if:  · You have a fever.  · You have swelling, soreness, warmth, or redness in your foot.  · Your feet are not getting better with treatment.  · Your symptoms get worse.  · You have new symptoms.  Summary  · Athlete's foot (tinea pedis) is a fungal infection of the skin on your feet. It often occurs on skin that is between or underneath the toes.  · This condition is caused by a fungus that grows in warm, moist places.  · Symptoms include white, flaky, or scaly areas between your toes or on the soles of your feet.  · This condition is treated with antifungal medicines.  · Keep your feet clean. Always dry them thoroughly.  This  information is not intended to replace advice given to you by your health care provider. Make sure you discuss any questions you have with your health care provider.  Document Released: 12/15/2001 Document Revised: 12/13/2018 Document Reviewed: 10/08/2018  Elsevier Patient Education © 2020 Elsevier Inc.

## 2021-04-20 NOTE — ASSESSMENT & PLAN NOTE
Right interdigital space between  first and second toe  Will rx lotrisone  creme apply bid   She will call if any persistent rash

## 2021-04-20 NOTE — ASSESSMENT & PLAN NOTE
Dx moderate sleep apnea  11/20 [facility] sleep center  will request old records for review  Not compliant with cpap to day started 11/20  Needs to followup  Provided her info regarding [facility] Dr Posada if wants to transition her care  Fatigue improved

## 2021-04-20 NOTE — ASSESSMENT & PLAN NOTE
will request old records from previous pcp dr Potter to review  Has not had headache in the last 10mg   Has Maxalt 10mg but has not needed  Controlled TMJ and using mouth [place] and compliant   Managing her stress despite pandemic    She will call if any worsening headaches  or increase in frequency   Will monitor

## 2021-04-20 NOTE — ASSESSMENT & PLAN NOTE
screening testosterone level 252.9 1/20  Nl free testo 2.6  No followup  Will refer to dr Mosley for followup

## 2021-04-20 NOTE — ASSESSMENT & PLAN NOTE
bp elevated today  Did not take norvasc 5mg one po qdaily today   Dash diet reviewed   Limited caffeine  Asymptomatic  Reviewed compliance with Norvasc 5mg one po qdaily   Tolerating without any side effects  Has home bp monitor and will start monitoring and call if any values over 140 and 85.   rtoq 1m

## 2021-04-21 ENCOUNTER — TELEPHONE (OUTPATIENT)
Dept: FAMILY MEDICINE | Facility: CLINIC | Age: 51
End: 2021-04-21

## 2021-04-22 ENCOUNTER — TELEPHONE (OUTPATIENT)
Dept: FAMILY MEDICINE | Facility: CLINIC | Age: 51
End: 2021-04-22

## 2021-04-22 NOTE — TELEPHONE ENCOUNTER
Transferred to Isabel. Confirmed with patient that she received Geovanna's message. However she was not satisfied with me sending a message and still wanted to speak to Geovanna or Isabel for another question related to previous call.

## 2021-05-04 LAB
BASOPHILS # BLD AUTO: 0 X10E3/UL (ref 0–0.2)
BASOPHILS NFR BLD AUTO: 1 %
EOSINOPHIL # BLD AUTO: 0.2 X10E3/UL (ref 0–0.4)
EOSINOPHIL NFR BLD AUTO: 4 %
ERYTHROCYTE [DISTWIDTH] IN BLOOD BY AUTOMATED COUNT: 12.4 % (ref 11.7–15.4)
HCT VFR BLD AUTO: 40.5 % (ref 34–46.6)
HGB BLD-MCNC: 14.2 G/DL (ref 11.1–15.9)
IMM GRANULOCYTES # BLD AUTO: 0 X10E3/UL (ref 0–0.1)
IMM GRANULOCYTES NFR BLD AUTO: 0 %
LYMPHOCYTES # BLD AUTO: 1.5 X10E3/UL (ref 0.7–3.1)
LYMPHOCYTES NFR BLD AUTO: 31 %
MCH RBC QN AUTO: 31.6 PG (ref 26.6–33)
MCHC RBC AUTO-ENTMCNC: 35.1 G/DL (ref 31.5–35.7)
MCV RBC AUTO: 90 FL (ref 79–97)
MONOCYTES # BLD AUTO: 0.4 X10E3/UL (ref 0.1–0.9)
MONOCYTES NFR BLD AUTO: 8 %
NEUTROPHILS # BLD AUTO: 2.7 X10E3/UL (ref 1.4–7)
NEUTROPHILS NFR BLD AUTO: 56 %
PLATELET # BLD AUTO: 260 X10E3/UL (ref 150–450)
RBC # BLD AUTO: 4.5 X10E6/UL (ref 3.77–5.28)
WBC # BLD AUTO: 4.8 X10E3/UL (ref 3.4–10.8)

## 2021-05-05 ENCOUNTER — TELEPHONE (OUTPATIENT)
Dept: FAMILY MEDICINE | Facility: CLINIC | Age: 51
End: 2021-05-05

## 2021-05-05 DIAGNOSIS — E78.49 OTHER HYPERLIPIDEMIA: Primary | ICD-10-CM

## 2021-05-05 LAB
ALBUMIN SERPL-MCNC: 4.3 G/DL (ref 3.8–4.8)
ALBUMIN/GLOB SERPL: 1.7 {RATIO} (ref 1.2–2.2)
ALP SERPL-CCNC: 98 IU/L (ref 39–117)
ALT SERPL-CCNC: 12 IU/L (ref 0–32)
AST SERPL-CCNC: 23 IU/L (ref 0–40)
BILIRUB SERPL-MCNC: 0.7 MG/DL (ref 0–1.2)
BUN SERPL-MCNC: 15 MG/DL (ref 6–24)
BUN/CREAT SERPL: 21 (ref 9–23)
CALCIUM SERPL-MCNC: 9.7 MG/DL (ref 8.7–10.2)
CHLORIDE SERPL-SCNC: 98 MMOL/L (ref 96–106)
CHOLEST SERPL-MCNC: 262 MG/DL (ref 100–199)
CO2 SERPL-SCNC: 26 MMOL/L (ref 20–29)
CREAT SERPL-MCNC: 0.7 MG/DL (ref 0.57–1)
GLOBULIN SER CALC-MCNC: 2.5 G/DL (ref 1.5–4.5)
GLUCOSE SERPL-MCNC: 90 MG/DL (ref 65–99)
HCV AB S/CO SERPL IA: 0.1 S/CO RATIO (ref 0–0.9)
HDLC SERPL-MCNC: 68 MG/DL
HIV 1+2 AB+HIV1 P24 AG SERPL QL IA: NON REACTIVE
LAB CORP EGFR IF AFRICN AM: 118 ML/MIN/1.73
LAB CORP EGFR IF NONAFRICN AM: 102 ML/MIN/1.73
LDLC SERPL CALC-MCNC: 170 MG/DL (ref 0–99)
POTASSIUM SERPL-SCNC: 4.6 MMOL/L (ref 3.5–5.2)
PROT SERPL-MCNC: 6.8 G/DL (ref 6–8.5)
SODIUM SERPL-SCNC: 137 MMOL/L (ref 134–144)
T4 FREE SERPL-MCNC: 1.14 NG/DL (ref 0.82–1.77)
TRIGL SERPL-MCNC: 137 MG/DL (ref 0–149)
TSH SERPL DL<=0.005 MIU/L-ACNC: 4.2 UIU/ML (ref 0.45–4.5)
VLDLC SERPL CALC-MCNC: 24 MG/DL (ref 5–40)

## 2021-05-05 NOTE — TELEPHONE ENCOUNTER
----- Message from Suzanne Garcia MA sent at 5/5/2021 10:02 AM EDT -----    ----- Message -----  From: Ramone Milligan DO  Sent: 5/5/2021   7:56 AM EDT  To: Suzanne Garcia MA    Inform her elevated screening tchol and ldl. Nl trigs and hdl. The 10-year ASCVD risk score  is: 5.9%. have her work on better low fat diet and aerobic exercise 30 min or more 3-4 times per week . Recheck flp in 6m and monitor . Nl screening cmp, cbc, thyroid studies , HIV And hep c antibody

## 2021-05-05 NOTE — RESULT ENCOUNTER NOTE
Inform her elevated screening tchol and ldl. Nl trigs and hdl. The 10-year ASCVD risk score  is: 5.9%. have her work on better low fat diet and aerobic exercise 30 min or more 3-4 times per week . Recheck flp in 6m and monitor . Nl screening cmp, cbc, thyroid studies , HIV And hep c antibody

## 2021-11-09 ENCOUNTER — OFFICE VISIT (OUTPATIENT)
Dept: FAMILY MEDICINE | Facility: CLINIC | Age: 51
End: 2021-11-09
Payer: COMMERCIAL

## 2021-11-09 VITALS
WEIGHT: 146.4 LBS | OXYGEN SATURATION: 99 % | HEART RATE: 92 BPM | SYSTOLIC BLOOD PRESSURE: 150 MMHG | DIASTOLIC BLOOD PRESSURE: 78 MMHG | BODY MASS INDEX: 25.94 KG/M2 | HEIGHT: 63 IN | TEMPERATURE: 97.9 F

## 2021-11-09 DIAGNOSIS — Z23 NEED FOR VIRAL IMMUNIZATION: ICD-10-CM

## 2021-11-09 DIAGNOSIS — G47.30 SLEEP APNEA IN ADULT: ICD-10-CM

## 2021-11-09 DIAGNOSIS — G43.009 MIGRAINE WITHOUT AURA AND WITHOUT STATUS MIGRAINOSUS, NOT INTRACTABLE: Chronic | ICD-10-CM

## 2021-11-09 DIAGNOSIS — I10 ESSENTIAL HYPERTENSION: Primary | Chronic | ICD-10-CM

## 2021-11-09 DIAGNOSIS — G47.9 FATIGUE DUE TO SLEEP PATTERN DISTURBANCE: ICD-10-CM

## 2021-11-09 DIAGNOSIS — Z78.9 DAILY CONSUMPTION OF ALCOHOL: ICD-10-CM

## 2021-11-09 DIAGNOSIS — Z12.11 COLON CANCER SCREENING: ICD-10-CM

## 2021-11-09 DIAGNOSIS — I10 ELEVATED BLOOD PRESSURE READING IN OFFICE WITH DIAGNOSIS OF HYPERTENSION: ICD-10-CM

## 2021-11-09 DIAGNOSIS — R79.89 ELEVATED TSH: Chronic | ICD-10-CM

## 2021-11-09 DIAGNOSIS — J30.2 SEASONAL ALLERGIES: Chronic | ICD-10-CM

## 2021-11-09 DIAGNOSIS — R53.83 FATIGUE DUE TO SLEEP PATTERN DISTURBANCE: ICD-10-CM

## 2021-11-09 PROCEDURE — 90472 IMMUNIZATION ADMIN EACH ADD: CPT | Performed by: STUDENT IN AN ORGANIZED HEALTH CARE EDUCATION/TRAINING PROGRAM

## 2021-11-09 PROCEDURE — 90750 HZV VACC RECOMBINANT IM: CPT | Performed by: STUDENT IN AN ORGANIZED HEALTH CARE EDUCATION/TRAINING PROGRAM

## 2021-11-09 PROCEDURE — 3008F BODY MASS INDEX DOCD: CPT | Performed by: STUDENT IN AN ORGANIZED HEALTH CARE EDUCATION/TRAINING PROGRAM

## 2021-11-09 PROCEDURE — 3078F DIAST BP <80 MM HG: CPT | Performed by: STUDENT IN AN ORGANIZED HEALTH CARE EDUCATION/TRAINING PROGRAM

## 2021-11-09 PROCEDURE — 99214 OFFICE O/P EST MOD 30 MIN: CPT | Mod: 25 | Performed by: STUDENT IN AN ORGANIZED HEALTH CARE EDUCATION/TRAINING PROGRAM

## 2021-11-09 PROCEDURE — 90471 IMMUNIZATION ADMIN: CPT | Performed by: STUDENT IN AN ORGANIZED HEALTH CARE EDUCATION/TRAINING PROGRAM

## 2021-11-09 PROCEDURE — 3077F SYST BP >= 140 MM HG: CPT | Performed by: STUDENT IN AN ORGANIZED HEALTH CARE EDUCATION/TRAINING PROGRAM

## 2021-11-09 PROCEDURE — 90686 IIV4 VACC NO PRSV 0.5 ML IM: CPT | Performed by: STUDENT IN AN ORGANIZED HEALTH CARE EDUCATION/TRAINING PROGRAM

## 2021-11-09 RX ORDER — AMLODIPINE BESYLATE 10 MG/1
10 TABLET ORAL
Qty: 90 TABLET | Refills: 0 | Status: SHIPPED | OUTPATIENT
Start: 2021-11-09 | End: 2022-02-11

## 2021-11-09 NOTE — PROGRESS NOTES
Subjective      Patient ID: Cayla Burris is a 51 y.o. female here for the following:   Follow-up    HPI  Hx of TIMO   No current cpap   Has not seen pulm   Does not want further sleep studies   No AM headaches   Feels ongoign longstanding fatigue   Caffeine: AM iced coffee 2 cups (12 oz)    Patient presents in follow up of HTN.  Taking medication as prescribed without side effects.    Home BP log:  Does not take     meds every day: part of routine with breakfast     Diet:  Following low salt and/or DASH diet    How many times do you eat out in a week? 1     How many times do you order out in a week? 0    24 hour food recall:     Breakfast: 10am cake (egg and quispe with toast usually)   Coffee additions: caramel macchiato creamer international delight    Wakes very hungry in the am     Lunch: 12:30/1pm blueberries, strawberries, nonfat plain yogurt in smoothies    Carrots broccoli or spinach    Dinner: 9pm chicken fajitas - green pepper, onion, shredded mexican cheese. Flour tortilla 1     Snacks: chicken cutlet     Liquid Calories: water     Exercise:  Has membership to [employer], BENZ, SOB, FNS, BP headaches, dizziness/lightheadedness with or without change in position, visual changes, orthopnea, paroxysmal nocturnal dyspnea, edema.  No weight change     Alcohol: whole small bottle wine 4 nights a week     BP Readings from Last 3 Encounters:   11/09/21 (!) 150/78   04/19/21 140/90   03/30/21 120/60     Wt Readings from Last 3 Encounters:   11/09/21 66.4 kg (146 lb 6.4 oz)   04/19/21 67 kg (147 lb 12.8 oz)   03/30/21 68 kg (150 lb)     BMI Readings from Last 3 Encounters:   11/09/21 25.93 kg/m²   04/19/21 25.55 kg/m²   03/30/21 26.57 kg/m²     Lab Results   Component Value Date    HGBA1C 4.6 (L) 01/03/2020       Lab Results   Component Value Date    TSH 4.200 05/04/2021     Lab Results   Component Value Date    GLUCOSE 90 05/04/2021     05/04/2021    K 4.6 05/04/2021    CO2 26 05/04/2021    CL 98  05/04/2021    BUN 15 05/04/2021    CREATININE 0.70 05/04/2021     Lab Results   Component Value Date    CHOL 262 (H) 05/04/2021     Lab Results   Component Value Date    HDL 68 05/04/2021     Lab Results   Component Value Date    LDLCALC 170 (H) 05/04/2021     Lab Results   Component Value Date    TRIG 137 05/04/2021     No results found for: CHOLHDL    The 10-year ASCVD risk score (Aldo LAUREANO Jr., et al., 2013) is: 7.1%    Values used to calculate the score:      Age: 51 years      Sex: Female      Is Non- : No      Diabetic: No      Tobacco smoker: Yes      Systolic Blood Pressure: 150 mmHg      Is BP treated: Yes      HDL Cholesterol: 68 mg/dL      Total Cholesterol: 262 mg/dL    The following have been reviewed and updated as appropriate in this visit:      Allergies  Meds  Problems  Social History      Patient Active Problem List   Diagnosis   • Migraine   • TIMO on CPAP   • Essential hypertension   • Tinea pedis of right foot   • Seasonal allergies   • Elevated testosterone level   • Elevated TSH   • Gastroesophageal reflux disease   • Carpal tunnel syndrome   • Depressive disorder   • Attention deficit hyperactivity disorder, predominantly inattentive type   • Fatigue due to sleep pattern disturbance   • Daily consumption of alcohol    .    Social History     Tobacco Use   • Smoking status: Current Some Day Smoker     Packs/day: 0.30     Years: 10.00     Pack years: 3.00     Types: Cigarettes   • Smokeless tobacco: Never Used   Vaping Use   • Vaping Use: Never used   Substance Use Topics   • Alcohol use: Yes     Alcohol/week: 2.0 standard drinks     Types: 2 Glasses of wine per week   • Drug use: No       Allergies as of 11/09/2021 - Reviewed 11/09/2021   Allergen Reaction Noted   • Penicillins  12/29/2015         Current Outpatient Medications:   •  amLODIPine 10 mg tablet, Take 1 tablet (10 mg total) by mouth once daily., Disp: 90 tablet, Rfl: 0  •  cyclobenzaprine (FLEXERIL) 5 mg  "tablet, Take 1 tablet (5 mg total) by mouth 2 (two) times a day as needed for muscle spasms., Disp: 30 tablet, Rfl: 1  •  loratadine (CLARITIN) 5 mg/5 mL syrup, No SIG Entered, Disp: , Rfl:   •  rizatriptan (MAXALT) 10 mg tablet, rizatriptan 10 mg tablet  One tablet under tongue May be repeated in 2 hs. Wait 72 hours before repeating regimen You can take Excedrin migraine for 72 Hs, before repeating Rizatriptan again., Disp: , Rfl:       Review of Systems   Constitutional: Negative for fatigue and unexpected weight change.   Respiratory: Negative for chest tightness and shortness of breath.    Cardiovascular: Negative for chest pain, palpitations and leg swelling.   Neurological: Negative for dizziness, syncope, weakness and headaches.     Objective   Vitals:   Vitals:    11/09/21 1425 11/09/21 1507   BP: (!) 150/86 (!) 150/78   BP Location: Right upper arm    Patient Position: Sitting    Pulse: 92    Temp: 36.6 °C (97.9 °F)    TempSrc: Oral    SpO2: 99%    Weight: 66.4 kg (146 lb 6.4 oz)    Height: 1.6 m (5' 3\")      Body mass index is 25.93 kg/m².    Physical Exam  GEN: well developed and nourished in NAD   HEET: NCAT MMM sclera anicteric   Neck: Supple, no carotid bruits, no thyromegaly nor thyroid nodules.  Lungs: Good air entry throughout, clear to auscultation throughout.  Heart: Regular rate and rhythm without murmur, gallop, click nor rub. No extrasystoles   Abdomen: Soft, non-tender, no abdominal bruits nor abdominal aortic aneurysm palpable, normal active bowel sounds, no hepatosplenomegaly nor masses.  Extremities: Dorsalis pedis and posterior tibialis pulses 1+ and equal bilaterally, no peripheral edema.  Skin: not pale, no lesions/rashes/petechiae      ASSESSMENT & PLAN    Problem List Items Addressed This Visit        Nervous    Migraine (Chronic)     A: stable  P: cont current meds         Relevant Medications    amLODIPine 10 mg tablet    Other Relevant Orders    Microalbumin/Creatinine Ur Random    " CBC and differential    Comprehensive metabolic panel    Lipid panel    TSH    T4    T3    Hemoglobin A1c    Vitamin B12    Folate    Vitamin D 25 hydroxy       Respiratory    TIMO on CPAP (Chronic)     Not on cpap   Counseled on focusing on weight loss             Circulatory    Essential hypertension - Primary (Chronic)     Chronic and unstable   Emphasized home monitoring    Counseled patient on lifestyle modifications including increased exercise, altering diet to incorporate more lean protein and fiber with de-emphasis on simple carbohydrates    F/u in office 6 weeks          Relevant Medications    amLODIPine 10 mg tablet    Other Relevant Orders    Microalbumin/Creatinine Ur Random    CBC and differential    Comprehensive metabolic panel    Lipid panel    TSH    T4    T3    Hemoglobin A1c    Vitamin B12    Folate    Vitamin D 25 hydroxy       Other    Seasonal allergies (Chronic)    Relevant Orders    Microalbumin/Creatinine Ur Random    CBC and differential    Comprehensive metabolic panel    Lipid panel    TSH    T4    T3    Hemoglobin A1c    Vitamin B12    Folate    Vitamin D 25 hydroxy    Elevated TSH (Chronic)     Recheck          Relevant Orders    Microalbumin/Creatinine Ur Random    CBC and differential    Comprehensive metabolic panel    Lipid panel    TSH    T4    T3    Hemoglobin A1c    Vitamin B12    Folate    Vitamin D 25 hydroxy    Fatigue due to sleep pattern disturbance    Relevant Orders    Microalbumin/Creatinine Ur Random    CBC and differential    Comprehensive metabolic panel    Lipid panel    TSH    T4    T3    Hemoglobin A1c    Vitamin B12    Folate    Vitamin D 25 hydroxy    Daily consumption of alcohol     Counseled can worsen her sleep patterns   Check labs again   F/u in 6 weeks          Relevant Orders    Microalbumin/Creatinine Ur Random    CBC and differential    Comprehensive metabolic panel    Lipid panel    TSH    T4    T3    Hemoglobin A1c    Vitamin B12    Folate    Vitamin D  25 hydroxy      Other Visit Diagnoses     Elevated blood pressure reading in office with diagnosis of hypertension        Relevant Medications    amLODIPine 10 mg tablet    Other Relevant Orders    Microalbumin/Creatinine Ur Random    CBC and differential    Comprehensive metabolic panel    Lipid panel    TSH    T4    T3    Hemoglobin A1c    Vitamin B12    Folate    Vitamin D 25 hydroxy    Need for viral immunization        Relevant Orders    Shingrix (Completed)    Influenza vaccine quadrivalent preservative free 6 mon and older IM (Completed)    Colon cancer screening        Relevant Orders    Fecal Immunochemical            _____________________  Bhavya Morales DO  11/10/21

## 2021-11-10 PROBLEM — Z00.00 ENCOUNTER FOR WELL ADULT EXAM WITHOUT ABNORMAL FINDINGS: Status: RESOLVED | Noted: 2021-04-19 | Resolved: 2021-11-10

## 2021-11-10 ASSESSMENT — ENCOUNTER SYMPTOMS
SHORTNESS OF BREATH: 0
PALPITATIONS: 0
CHEST TIGHTNESS: 0
FATIGUE: 0
HEADACHES: 0
DIZZINESS: 0
WEAKNESS: 0
UNEXPECTED WEIGHT CHANGE: 0

## 2021-11-11 NOTE — ASSESSMENT & PLAN NOTE
Chronic and unstable   Emphasized home monitoring    Counseled patient on lifestyle modifications including increased exercise, altering diet to incorporate more lean protein and fiber with de-emphasis on simple carbohydrates    F/u in office 6 weeks

## 2022-02-11 DIAGNOSIS — I10 ELEVATED BLOOD PRESSURE READING IN OFFICE WITH DIAGNOSIS OF HYPERTENSION: ICD-10-CM

## 2022-02-11 DIAGNOSIS — I10 ESSENTIAL HYPERTENSION: Chronic | ICD-10-CM

## 2022-02-11 RX ORDER — AMLODIPINE BESYLATE 10 MG/1
TABLET ORAL
Qty: 90 TABLET | Refills: 0 | Status: SHIPPED | OUTPATIENT
Start: 2022-02-11 | End: 2022-05-12

## 2022-02-11 NOTE — TELEPHONE ENCOUNTER
Diagnoses and all orders for this visit:    Essential hypertension  -     amLODIPine (NORVASC) 10 mg tablet; TAKE 1 TABLET(10 MG) BY MOUTH EVERY DAY    Elevated blood pressure reading in office with diagnosis of hypertension  -     amLODIPine (NORVASC) 10 mg tablet; TAKE 1 TABLET(10 MG) BY MOUTH EVERY DAY

## 2022-02-11 NOTE — TELEPHONE ENCOUNTER
Medicine Refill Request    Last Office: 11/9/2021   Last Consult Visit: Visit date not found  Last Telemedicine Visit: Visit date not found    Next Appointment: Visit date not found      Current Outpatient Medications:   •  amLODIPine 10 mg tablet, Take 1 tablet (10 mg total) by mouth once daily., Disp: 90 tablet, Rfl: 0  •  cyclobenzaprine (FLEXERIL) 5 mg tablet, Take 1 tablet (5 mg total) by mouth 2 (two) times a day as needed for muscle spasms., Disp: 30 tablet, Rfl: 1  •  loratadine (CLARITIN) 5 mg/5 mL syrup, No SIG Entered, Disp: , Rfl:   •  rizatriptan (MAXALT) 10 mg tablet, rizatriptan 10 mg tablet  One tablet under tongue May be repeated in 2 hs. Wait 72 hours before repeating regimen You can take Excedrin migraine for 72 Hs, before repeating Rizatriptan again., Disp: , Rfl:       BP Readings from Last 3 Encounters:   11/09/21 (!) 150/78   04/19/21 140/90   03/30/21 120/60       Recent Lab results:  Lab Results   Component Value Date    CHOL 262 (H) 05/04/2021   ,   Lab Results   Component Value Date    HDL 68 05/04/2021   ,   Lab Results   Component Value Date    LDLCALC 170 (H) 05/04/2021   ,   Lab Results   Component Value Date    TRIG 137 05/04/2021        Lab Results   Component Value Date    GLUCOSE 90 05/04/2021   ,   Lab Results   Component Value Date    HGBA1C 4.6 (L) 01/03/2020         Lab Results   Component Value Date    CREATININE 0.70 05/04/2021       Lab Results   Component Value Date    TSH 4.200 05/04/2021

## 2022-03-01 ENCOUNTER — OFFICE VISIT (OUTPATIENT)
Dept: FAMILY MEDICINE | Facility: CLINIC | Age: 52
End: 2022-03-01
Payer: COMMERCIAL

## 2022-03-01 VITALS
BODY MASS INDEX: 25.55 KG/M2 | WEIGHT: 144.2 LBS | HEART RATE: 74 BPM | TEMPERATURE: 97.9 F | OXYGEN SATURATION: 98 % | SYSTOLIC BLOOD PRESSURE: 138 MMHG | HEIGHT: 63 IN | DIASTOLIC BLOOD PRESSURE: 74 MMHG

## 2022-03-01 DIAGNOSIS — R53.83 FATIGUE DUE TO SLEEP PATTERN DISTURBANCE: ICD-10-CM

## 2022-03-01 DIAGNOSIS — Z78.9 DAILY CONSUMPTION OF ALCOHOL: ICD-10-CM

## 2022-03-01 DIAGNOSIS — Z23 NEED FOR VIRAL IMMUNIZATION: ICD-10-CM

## 2022-03-01 DIAGNOSIS — G47.9 FATIGUE DUE TO SLEEP PATTERN DISTURBANCE: ICD-10-CM

## 2022-03-01 DIAGNOSIS — N63.0 BREAST LUMP: ICD-10-CM

## 2022-03-01 DIAGNOSIS — F32.A DEPRESSIVE DISORDER: ICD-10-CM

## 2022-03-01 DIAGNOSIS — I10 ESSENTIAL HYPERTENSION: Primary | Chronic | ICD-10-CM

## 2022-03-01 PROCEDURE — 3078F DIAST BP <80 MM HG: CPT | Performed by: STUDENT IN AN ORGANIZED HEALTH CARE EDUCATION/TRAINING PROGRAM

## 2022-03-01 PROCEDURE — 99214 OFFICE O/P EST MOD 30 MIN: CPT | Mod: 25 | Performed by: STUDENT IN AN ORGANIZED HEALTH CARE EDUCATION/TRAINING PROGRAM

## 2022-03-01 PROCEDURE — 90750 HZV VACC RECOMBINANT IM: CPT | Performed by: STUDENT IN AN ORGANIZED HEALTH CARE EDUCATION/TRAINING PROGRAM

## 2022-03-01 PROCEDURE — 3008F BODY MASS INDEX DOCD: CPT | Performed by: STUDENT IN AN ORGANIZED HEALTH CARE EDUCATION/TRAINING PROGRAM

## 2022-03-01 PROCEDURE — 3075F SYST BP GE 130 - 139MM HG: CPT | Performed by: STUDENT IN AN ORGANIZED HEALTH CARE EDUCATION/TRAINING PROGRAM

## 2022-03-01 PROCEDURE — 90471 IMMUNIZATION ADMIN: CPT | Performed by: STUDENT IN AN ORGANIZED HEALTH CARE EDUCATION/TRAINING PROGRAM

## 2022-03-01 RX ORDER — TRAZODONE HYDROCHLORIDE 50 MG/1
50 TABLET ORAL NIGHTLY
Qty: 90 TABLET | Refills: 3 | Status: SHIPPED | OUTPATIENT
Start: 2022-03-01 | End: 2024-03-18 | Stop reason: SDUPTHER

## 2022-03-01 ASSESSMENT — ENCOUNTER SYMPTOMS
NECK PAIN: 0
NUMBNESS: 0
APPETITE CHANGE: 0
NECK STIFFNESS: 0
UNEXPECTED WEIGHT CHANGE: 0
HEADACHES: 0
LIGHT-HEADEDNESS: 0
ACTIVITY CHANGE: 0
CHEST TIGHTNESS: 0
SPEECH DIFFICULTY: 0
FATIGUE: 0
SHORTNESS OF BREATH: 0
SLEEP DISTURBANCE: 1
WEAKNESS: 0
DIZZINESS: 0
TREMORS: 0
VOMITING: 0
NERVOUS/ANXIOUS: 1
PALPITATIONS: 0
PHOTOPHOBIA: 0
DYSPHORIC MOOD: 1
NAUSEA: 0

## 2022-03-01 NOTE — ASSESSMENT & PLAN NOTE
Acute on chronic issue  Thankfully her depression scores are reasonably well-controlled today.  We will trial trazodone nightly.  If her symptoms are incompletely controlled, we can increase the dose to 100 mg nightly versus adding Lexapro 5 mg  Follow-up 3 months

## 2022-03-01 NOTE — ASSESSMENT & PLAN NOTE
I have counseled patient that nightly alcohol use is both a diuretic and will elevate her blood pressure, though I have counseled her against abrupt cessation for fear of withdrawal symptoms.

## 2022-03-01 NOTE — PATIENT INSTRUCTIONS
Please drop off the fit test!    bedtime 11pm with 1 glass wine and 1 trazodone   Patient Education   Recombinant Zoster (Shingles) Vaccine: What You Need to Know  1. Why get vaccinated?  Recombinant zoster (shingles) vaccine can prevent shingles.  Shingles (also called herpes zoster, or just zoster) is a painful skin rash, usually with blisters. In addition to the rash, shingles can cause fever, headache, chills, or upset stomach. More rarely, shingles can lead to pneumonia, hearing problems, blindness, brain inflammation (encephalitis), or death.  The most common complication of shingles is long-term nerve pain called postherpetic neuralgia (PHN). PHN occurs in the areas where the shingles rash was, even after the rash clears up. It can last for months or years after the rash goes away. The pain from PHN can be severe and debilitating.  About 10 to 18% of people who get shingles will experience PHN. The risk of PHN increases with age. An older adult with shingles is more likely to develop PHN and have longer lasting and more severe pain than a younger person with shingles.  Shingles is caused by the varicella zoster virus, the same virus that causes chickenpox. After you have chickenpox, the virus stays in your body and can cause shingles later in life. Shingles cannot be passed from one person to another, but the virus that causes shingles can spread and cause chickenpox in someone who had never had chickenpox or received chickenpox vaccine.  2. Recombinant shingles vaccine  Recombinant shingles vaccine provides strong protection against shingles. By preventing shingles, recombinant shingles vaccine also protects against PHN.  Recombinant shingles vaccine is the preferred vaccine for the prevention of shingles. However, a different vaccine, live shingles vaccine, may be used in some circumstances.  The recombinant shingles vaccine is recommended for adults 50 years and older without serious immune problems. It is  given as a two-dose series.  This vaccine is also recommended for people who have already gotten another type of shingles vaccine, the live shingles vaccine. There is no live virus in this vaccine.  Shingles vaccine may be given at the same time as other vaccines.  3. Talk with your health care provider  Tell your vaccine provider if the person getting the vaccine:  · Has had an allergic reaction after a previous dose of recombinant shingles vaccine, or has any severe, life-threatening allergies.  · Is pregnant or breastfeeding.  · Is currently experiencing an episode of shingles.  In some cases, your health care provider may decide to postpone shingles vaccination to a future visit.  People with minor illnesses, such as a cold, may be vaccinated. People who are moderately or severely ill should usually wait until they recover before getting recombinant shingles vaccine.  Your health care provider can give you more information.  4. Risks of a vaccine reaction  · A sore arm with mild or moderate pain is very common after recombinant shingles vaccine, affecting about 80% of vaccinated people. Redness and swelling can also happen at the site of the injection.  · Tiredness, muscle pain, headache, shivering, fever, stomach pain, and nausea happen after vaccination in more than half of people who receive recombinant shingles vaccine.  In clinical trials, about 1 out of 6 people who got recombinant zoster vaccine experienced side effects that prevented them from doing regular activities. Symptoms usually went away on their own in 2 to 3 days.  You should still get the second dose of recombinant zoster vaccine even if you had one of these reactions after the first dose.  People sometimes faint after medical procedures, including vaccination. Tell your provider if you feel dizzy or have vision changes or ringing in the ears.  As with any medicine, there is a very remote chance of a vaccine causing a severe allergic reaction,  other serious injury, or death.  5. What if there is a serious problem?  An allergic reaction could occur after the vaccinated person leaves the clinic. If you see signs of a severe allergic reaction (hives, swelling of the face and throat, difficulty breathing, a fast heartbeat, dizziness, or weakness), call 9-1-1 and get the person to the nearest hospital.  For other signs that concern you, call your health care provider.  Adverse reactions should be reported to the Vaccine Adverse Event Reporting System (VAERS). Your health care provider will usually file this report, or you can do it yourself. Visit the VAERS website at www.vaers.[facility].gov or call 1-921.212.4861. VAERS is only for reporting reactions, and VAERS staff do not give medical advice.  6. How can I learn more?  · Ask your health care provider.  · Call your local or state health department.  · Contact the Centers for Disease Control and Prevention (CDC):  ? Call 1-311.381.8782 (6-324-FQA-INFO) or  ? Visit CDC's website at www.cdc.gov/vaccines  Vaccine Information Statement Recombinant Zoster Vaccine (10/30/2019)  This information is not intended to replace advice given to you by your health care provider. Make sure you discuss any questions you have with your health care provider.  Document Revised: 12/09/2020 Document Reviewed: 12/09/2020  Elsevier Patient Education © 2021 [employer]

## 2022-03-01 NOTE — ASSESSMENT & PLAN NOTE
Chronic and reasonably well-controlled on current therapy as  Counseled on mindfulness practices and controlling her sleep cycle to get back on an exercise routine  Follow-up 3 months or sooner as needed for BP greater than 140/80

## 2022-03-01 NOTE — PROGRESS NOTES
Subjective      Patient ID: Cayla Burris is a 51 y.o. female here for the following:   Hypertension (hypertension follow up)      HPI    Last office visit 11/9/2021 at which point patient was continued on her amlodipine, and counseled to complete lab work.  Patient has not completed blood work yet as she recommended she woke up in the middle the night and started snacking  Also tells me she has not been checking her blood pressures at home as it makes her anxious.  Feeling otherwise well outside of her chronic history of insomnia    Hx of TIMO   No current cpap   Has not seen pulm   Does not want further sleep studies   No AM headaches   Feels ongoign longstanding fatigue   Caffeine: AM iced coffee 2 cups (12 oz)    Prior OB left the practice   Last appt with on in march 2021     #Sleep disturbance   OTC sleep remedies?  No  Homeopathic sleep remedies?  No  what time do you go to bed? Midnight     after you lie down, how long does it take you to fall asleep?  1 hour    Do you leave the television or radio on as you are attempting to fall asleep?  Yes     After falling asleep, what time do you first wake up? What awakens you? 4 am is common, going to bathroom or hungey    Or can go 10 am      How often do you use the bathroom at night? occasional    Do you have pain at night?  No     How long does it take you to return to sleep after waking up? Right back     What time do you get out of bed in the morning?  Approximately 10 AM    Do you feel rested in the morning? No     Do you nap during the day? No     Do you drink alcohol or use other drugs?  Wine 2 glasses every night     Do you exercise? What time of day?  She is not currently exercising and is concerned about her ability to control her blood pressure and her sleep/fatigue without exercising    Patient does ask about Lexapro but notes that her symptoms of sleep disturbance/fatigue are more prominent for her than depression symptoms.  Denies current panic  "attacks    Last PHQ-9: N/A  Last KAYLYN-7: N/A    Current suicidal/homicidal ideation?: No  Current stressors: \" Life, taxation, kids\"  Support system:   Alcohol or recreational drug use?:  Denies recreational drug use  Currently in counseling/CBT?:  No    Manic symptoms (hyperarousal, grandiosity, no need for sleep, risk taking, hypersexual, racing thoughts, distractible)?: No        Wt Readings from Last 3 Encounters:   03/01/22 65.4 kg (144 lb 3.2 oz)   11/09/21 66.4 kg (146 lb 6.4 oz)   04/19/21 67 kg (147 lb 12.8 oz)       The following have been reviewed and updated as appropriate in this visit:      Allergies  Meds  Problems  Social History      Patient Active Problem List   Diagnosis   • Migraine   • TIMO on CPAP   • Essential hypertension   • Tinea pedis of right foot   • Seasonal allergies   • Elevated testosterone level   • Elevated TSH   • Gastroesophageal reflux disease   • Carpal tunnel syndrome   • Depressive disorder   • Attention deficit hyperactivity disorder, predominantly inattentive type   • Fatigue due to sleep pattern disturbance   • Daily consumption of alcohol    .    Social History     Tobacco Use   • Smoking status: Current Some Day Smoker     Packs/day: 0.30     Years: 10.00     Pack years: 3.00     Types: Cigarettes   • Smokeless tobacco: Never Used   Vaping Use   • Vaping Use: Never used   Substance Use Topics   • Alcohol use: Yes     Alcohol/week: 2.0 standard drinks     Types: 2 Glasses of wine per week   • Drug use: No       Allergies as of 03/01/2022 - Reviewed 03/01/2022   Allergen Reaction Noted   • Penicillins  12/29/2015         Current Outpatient Medications:   •  amLODIPine (NORVASC) 10 mg tablet, TAKE 1 TABLET(10 MG) BY MOUTH EVERY DAY, Disp: 90 tablet, Rfl: 0  •  cyclobenzaprine (FLEXERIL) 5 mg tablet, Take 1 tablet (5 mg total) by mouth 2 (two) times a day as needed for muscle spasms., Disp: 30 tablet, Rfl: 1  •  loratadine (CLARITIN) 5 mg/5 mL syrup, No SIG Entered, " "Disp: , Rfl:   •  rizatriptan (MAXALT) 10 mg tablet, rizatriptan 10 mg tablet  One tablet under tongue May be repeated in 2 hs. Wait 72 hours before repeating regimen You can take Excedrin migraine for 72 Hs, before repeating Rizatriptan again., Disp: , Rfl:   •  traZODone (DESYREL) 50 mg tablet, Take 1 tablet (50 mg total) by mouth nightly., Disp: 90 tablet, Rfl: 3      Review of Systems   Constitutional: Negative for activity change, appetite change and fatigue.   Eyes: Negative for photophobia and visual disturbance.        Denies double or blurry vision   Gastrointestinal: Negative for nausea and vomiting.   Musculoskeletal: Negative for gait problem, neck pain and neck stiffness.   Neurological: Negative for dizziness, tremors, syncope, speech difficulty, weakness, light-headedness, numbness and headaches.        Denies aura    Psychiatric/Behavioral: Positive for dysphoric mood and sleep disturbance. The patient is nervous/anxious.      Objective   Vitals:   Vitals:    03/01/22 1544 03/01/22 1620   BP: (!) 144/80 138/74   BP Location: Left upper arm Right upper arm   Patient Position: Sitting Sitting   Pulse: 74    Temp: 36.6 °C (97.9 °F)    TempSrc: Oral    SpO2: 98%    Weight: 65.4 kg (144 lb 3.2 oz)    Height: 1.6 m (5' 3\")      Body mass index is 25.54 kg/m².    Physical Exam    GEN: well developed, well nourished. In NAD   Neck: No thyromegaly or nodules.  Lungs: Good air entry no rales rhonchi nor wheezes.  Heart: Regular rate and rhythm without murmur gallop click or rub.  Abdomen: Soft without hepatosplenomegaly nor masses.  Extremities: Without tremor nor edema  Psychiatric: Oriented to time, place, person and situation.  No agitation, anhedonia anxiousness.  Flat mood and affect.  No forgetfulness nor grandiosity.  No hallucinations, hopelessness nor helplessness.  No memory loss, mood swings nor obsessive thoughts.  Insight, judgment, attention span and concentration are normal.  There is no " pressured speech nor flight of ideas.  Patient denies suicidal ideation and homicidal ideation.    ASSESSMENT & PLAN    Problem List Items Addressed This Visit        Circulatory    Essential hypertension - Primary (Chronic)     Chronic and reasonably well-controlled on current therapy as  Counseled on mindfulness practices and controlling her sleep cycle to get back on an exercise routine  Follow-up 3 months or sooner as needed for BP greater than 140/80         Relevant Orders    Microalbumin/Creatinine Ur Random       Other    Depressive disorder    Relevant Medications    traZODone (DESYREL) 50 mg tablet    Fatigue due to sleep pattern disturbance     Acute on chronic issue  Thankfully her depression scores are reasonably well-controlled today.  We will trial trazodone nightly.  If her symptoms are incompletely controlled, we can increase the dose to 100 mg nightly versus adding Lexapro 5 mg  Follow-up 3 months         Relevant Medications    traZODone (DESYREL) 50 mg tablet    Daily consumption of alcohol      I have counseled patient that nightly alcohol use is both a diuretic and will elevate her blood pressure, though I have counseled her against abrupt cessation for fear of withdrawal symptoms.             Other Visit Diagnoses     Need for viral immunization        Second shingles completed today    Relevant Orders    Shingrix (Completed)    Breast lump        Establish care with new obstetrician    Relevant Orders    Ambulatory referral to Obstetrics / Gynecology            _____________________  Bhavya Morales DO  03/01/22

## 2022-03-01 NOTE — PROGRESS NOTES
Subjective      Patient ID: Cayla Burris is a 51 y.o. female here for the following:   No chief complaint on file.    HPI     Last office visit 11/9/2021 at which point patient was continued on her amlodipine, and counseled to complete lab work.    Hx of TIMO   No current cpap   Has not seen pulm   Does not want further sleep studies   No AM headaches   Feels ongoign longstanding fatigue   Caffeine: AM iced coffee 2 cups (12 oz)    Patient presents in follow up of HTN.  Taking medication as prescribed without side effects.    Home BP log:  Does not take     meds every day: part of routine with breakfast     Diet:  Following low salt and/or DASH diet    How many times do you eat out in a week? 1     How many times do you order out in a week? 0    24 hour food recall:     Breakfast: 10am cake (egg and quispe with toast usually)   Coffee additions: caramel macchiato creamer international delight    Wakes very hungry in the am     Lunch: 12:30/1pm blueberries, strawberries, nonfat plain yogurt in smoothies    Carrots broccoli or spinach    Dinner: 9pm chicken fajitas - green pepper, onion, shredded mexican cheese. Flour tortilla 1     Snacks: chicken cutlet     Liquid Calories: water     Exercise:  Has membership to [employer]     Denies CPP, BENZ, SOB, FNS, BP headaches, dizziness/lightheadedness with or without change in position, visual changes, orthopnea, paroxysmal nocturnal dyspnea, edema.  No weight change     Alcohol: whole small bottle wine 4 nights a week     BP Readings from Last 3 Encounters:   11/09/21 (!) 150/78   04/19/21 140/90   03/30/21 120/60     Wt Readings from Last 3 Encounters:   11/09/21 66.4 kg (146 lb 6.4 oz)   04/19/21 67 kg (147 lb 12.8 oz)   03/30/21 68 kg (150 lb)     BMI Readings from Last 3 Encounters:   11/09/21 25.93 kg/m²   04/19/21 25.55 kg/m²   03/30/21 26.57 kg/m²     Lab Results   Component Value Date    HGBA1C 4.6 (L) 01/03/2020       Lab Results   Component Value Date    TSH 4.200  05/04/2021     Lab Results   Component Value Date    GLUCOSE 90 05/04/2021     05/04/2021    K 4.6 05/04/2021    CO2 26 05/04/2021    CL 98 05/04/2021    BUN 15 05/04/2021    CREATININE 0.70 05/04/2021     Lab Results   Component Value Date    CHOL 262 (H) 05/04/2021     Lab Results   Component Value Date    HDL 68 05/04/2021     Lab Results   Component Value Date    LDLCALC 170 (H) 05/04/2021     Lab Results   Component Value Date    TRIG 137 05/04/2021     No results found for: CHOLHDL    The 10-year ASCVD risk score (Aldo LAUREANO Jr., et al., 2013) is: 7.1%    Values used to calculate the score:      Age: 51 years      Sex: Female      Is Non- : No      Diabetic: No      Tobacco smoker: Yes      Systolic Blood Pressure: 150 mmHg      Is BP treated: Yes      HDL Cholesterol: 68 mg/dL      Total Cholesterol: 262 mg/dL    The following have been reviewed and updated as appropriate in this visit:      Allergies  Meds  Problems  Social History      Patient Active Problem List   Diagnosis   • Migraine   • TIMO on CPAP   • Essential hypertension   • Tinea pedis of right foot   • Seasonal allergies   • Elevated testosterone level   • Elevated TSH   • Gastroesophageal reflux disease   • Carpal tunnel syndrome   • Depressive disorder   • Attention deficit hyperactivity disorder, predominantly inattentive type   • Fatigue due to sleep pattern disturbance   • Daily consumption of alcohol    .    Social History     Tobacco Use   • Smoking status: Current Some Day Smoker     Packs/day: 0.30     Years: 10.00     Pack years: 3.00     Types: Cigarettes   • Smokeless tobacco: Never Used   Vaping Use   • Vaping Use: Never used   Substance Use Topics   • Alcohol use: Yes     Alcohol/week: 2.0 standard drinks     Types: 2 Glasses of wine per week   • Drug use: No       Allergies as of 03/01/2022 - Reviewed 11/09/2021   Allergen Reaction Noted   • Penicillins  12/29/2015         Current Outpatient  Medications:   •  amLODIPine (NORVASC) 10 mg tablet, TAKE 1 TABLET(10 MG) BY MOUTH EVERY DAY, Disp: 90 tablet, Rfl: 0  •  cyclobenzaprine (FLEXERIL) 5 mg tablet, Take 1 tablet (5 mg total) by mouth 2 (two) times a day as needed for muscle spasms., Disp: 30 tablet, Rfl: 1  •  loratadine (CLARITIN) 5 mg/5 mL syrup, No SIG Entered, Disp: , Rfl:   •  rizatriptan (MAXALT) 10 mg tablet, rizatriptan 10 mg tablet  One tablet under tongue May be repeated in 2 hs. Wait 72 hours before repeating regimen You can take Excedrin migraine for 72 Hs, before repeating Rizatriptan again., Disp: , Rfl:       Review of Systems   Constitutional: Negative for fatigue and unexpected weight change.   Respiratory: Negative for chest tightness and shortness of breath.    Cardiovascular: Negative for chest pain, palpitations and leg swelling.   Neurological: Negative for dizziness, syncope, weakness and headaches.     Objective   Vitals:   There were no vitals filed for this visit.  There is no height or weight on file to calculate BMI.    Physical Exam  GEN: well developed and nourished in NAD   HEET: NCAT MMM sclera anicteric   Neck: Supple, no carotid bruits, no thyromegaly nor thyroid nodules.  Lungs: Good air entry throughout, clear to auscultation throughout.  Heart: Regular rate and rhythm without murmur, gallop, click nor rub. No extrasystoles   Abdomen: Soft, non-tender, no abdominal bruits nor abdominal aortic aneurysm palpable, normal active bowel sounds, no hepatosplenomegaly nor masses.  Extremities: Dorsalis pedis and posterior tibialis pulses 1+ and equal bilaterally, no peripheral edema.  Skin: not pale, no lesions/rashes/petechiae      ASSESSMENT & PLAN    Problem List Items Addressed This Visit        Circulatory    Essential hypertension - Primary (Chronic)            _____________________  Bhavya Morales,   03/01/22

## 2022-03-02 LAB
ALBUMIN/CREAT UR: <20 MG/G CREAT (ref 0–29)
CREAT UR-MCNC: 15.1 MG/DL
MICROALBUMIN UR-MCNC: <3 UG/ML

## 2022-04-15 RX ORDER — CYCLOBENZAPRINE HCL 5 MG
TABLET ORAL
Qty: 30 TABLET | Refills: 1 | Status: SHIPPED | OUTPATIENT
Start: 2022-04-15 | End: 2024-01-26 | Stop reason: SDUPTHER

## 2022-04-15 NOTE — TELEPHONE ENCOUNTER
Medicine Refill Request    Last Office: 3/1/2022   Last Consult Visit: Visit date not found  Last Telemedicine Visit: Visit date not found    Next Appointment: 6/1/2022      Current Outpatient Medications:   •  amLODIPine (NORVASC) 10 mg tablet, TAKE 1 TABLET(10 MG) BY MOUTH EVERY DAY, Disp: 90 tablet, Rfl: 0  •  cyclobenzaprine (FLEXERIL) 5 mg tablet, Take 1 tablet (5 mg total) by mouth 2 (two) times a day as needed for muscle spasms., Disp: 30 tablet, Rfl: 1  •  loratadine (CLARITIN) 5 mg/5 mL syrup, No SIG Entered, Disp: , Rfl:   •  rizatriptan (MAXALT) 10 mg tablet, rizatriptan 10 mg tablet  One tablet under tongue May be repeated in 2 hs. Wait 72 hours before repeating regimen You can take Excedrin migraine for 72 Hs, before repeating Rizatriptan again., Disp: , Rfl:   •  traZODone (DESYREL) 50 mg tablet, Take 1 tablet (50 mg total) by mouth nightly., Disp: 90 tablet, Rfl: 3      BP Readings from Last 3 Encounters:   03/01/22 138/74   11/09/21 (!) 150/78   04/19/21 140/90       Recent Lab results:  Lab Results   Component Value Date    CHOL 262 (H) 05/04/2021   ,   Lab Results   Component Value Date    HDL 68 05/04/2021   ,   Lab Results   Component Value Date    LDLCALC 170 (H) 05/04/2021   ,   Lab Results   Component Value Date    TRIG 137 05/04/2021        Lab Results   Component Value Date    GLUCOSE 90 05/04/2021   ,   Lab Results   Component Value Date    HGBA1C 4.6 (L) 01/03/2020         Lab Results   Component Value Date    CREATININE 0.70 05/04/2021       Lab Results   Component Value Date    TSH 4.200 05/04/2021

## 2022-04-29 ENCOUNTER — TELEPHONE (OUTPATIENT)
Dept: FAMILY MEDICINE | Facility: CLINIC | Age: 52
End: 2022-04-29
Payer: COMMERCIAL

## 2022-04-29 NOTE — TELEPHONE ENCOUNTER
Patient scheduled for cataract surgery on 5/25 last seen in office 3/1. Patient stated facility where she is getting it done says she doesn't need to have a pre-op and that the forms can be filled oout. Advised patient Dr. ROMEO is out of office until Monday and I am unable to make the call if she can just sign off or not. Let patient know out office would be in touch.

## 2022-04-30 NOTE — TELEPHONE ENCOUNTER
I do not fill preop forms typically without a visit. Yes patient was last on 3/1 but if that time she had not yet even completed lab work. This needs to be completed and yes she needs a preop visit. Thank you

## 2022-05-02 NOTE — TELEPHONE ENCOUNTER
Please remind her that she needs to complete fasting labs prior to ov as surgery needs most recent blood work. Ty

## 2022-05-12 ENCOUNTER — CONSULT (OUTPATIENT)
Dept: FAMILY MEDICINE | Facility: CLINIC | Age: 52
End: 2022-05-12
Payer: COMMERCIAL

## 2022-05-12 VITALS
TEMPERATURE: 98.2 F | BODY MASS INDEX: 24.34 KG/M2 | HEIGHT: 63 IN | DIASTOLIC BLOOD PRESSURE: 70 MMHG | HEART RATE: 86 BPM | SYSTOLIC BLOOD PRESSURE: 122 MMHG | OXYGEN SATURATION: 99 % | WEIGHT: 137.4 LBS

## 2022-05-12 DIAGNOSIS — H25.11 AGE-RELATED NUCLEAR CATARACT OF RIGHT EYE: ICD-10-CM

## 2022-05-12 DIAGNOSIS — Z01.818 PREOP EXAMINATION: Primary | ICD-10-CM

## 2022-05-12 DIAGNOSIS — B35.3 TINEA PEDIS OF BOTH FEET: ICD-10-CM

## 2022-05-12 DIAGNOSIS — B35.3 TINEA PEDIS OF RIGHT FOOT: Chronic | ICD-10-CM

## 2022-05-12 DIAGNOSIS — Z78.9 DAILY CONSUMPTION OF ALCOHOL: ICD-10-CM

## 2022-05-12 DIAGNOSIS — I10 ESSENTIAL HYPERTENSION: Chronic | ICD-10-CM

## 2022-05-12 PROCEDURE — 3008F BODY MASS INDEX DOCD: CPT | Performed by: STUDENT IN AN ORGANIZED HEALTH CARE EDUCATION/TRAINING PROGRAM

## 2022-05-12 PROCEDURE — 3078F DIAST BP <80 MM HG: CPT | Performed by: STUDENT IN AN ORGANIZED HEALTH CARE EDUCATION/TRAINING PROGRAM

## 2022-05-12 PROCEDURE — 99214 OFFICE O/P EST MOD 30 MIN: CPT | Mod: 25 | Performed by: STUDENT IN AN ORGANIZED HEALTH CARE EDUCATION/TRAINING PROGRAM

## 2022-05-12 PROCEDURE — 3074F SYST BP LT 130 MM HG: CPT | Performed by: STUDENT IN AN ORGANIZED HEALTH CARE EDUCATION/TRAINING PROGRAM

## 2022-05-12 PROCEDURE — 99401 PREV MED CNSL INDIV APPRX 15: CPT | Performed by: STUDENT IN AN ORGANIZED HEALTH CARE EDUCATION/TRAINING PROGRAM

## 2022-05-12 RX ORDER — CLOTRIMAZOLE 1 %
CREAM (GRAM) TOPICAL 2 TIMES DAILY
Qty: 45 G | Refills: 1 | Status: SHIPPED | OUTPATIENT
Start: 2022-05-12 | End: 2022-06-11

## 2022-05-12 RX ORDER — AMLODIPINE BESYLATE 10 MG/1
5 TABLET ORAL DAILY
Qty: 90 TABLET | Refills: 0 | Status: SHIPPED | OUTPATIENT
Start: 2022-05-12 | End: 2023-10-18 | Stop reason: SDUPTHER

## 2022-05-12 NOTE — PROGRESS NOTES
Subjective      Patient ID: Cayla Burris is a 51 y.o. female here for the following:   Pre-op Exam (Pre op exam for cataract surgery 5/25/22, patient states ekg and labs are not required)      HPI  Surgeon: Dr Allen   Anesthesia Type: typically benzo     Previous issues with anesthesia:   Delayed emergence? no  Temperature instability? no  PONV? No   Sleep Apnea? Yes   + intraop awareness      She reports that she can climb a flight of stairs and walk at least a city block in distance without chest discomfort or shortness of breath.    She denies any chest pain, palpitations, shortness of breath, PND, light-headedness, syncope or stroke-like symptoms.     no recent snoring   2 glasses a night wine     optavia 5 packers and 1 lean and green   Eating q 3 hours   Intermittently feeling hungry     BP Readings from Last 3 Encounters:   05/12/22 122/70   03/01/22 138/74   11/09/21 (!) 150/78     Wt Readings from Last 3 Encounters:   05/12/22 62.3 kg (137 lb 6.4 oz)   03/01/22 65.4 kg (144 lb 3.2 oz)   11/09/21 66.4 kg (146 lb 6.4 oz)       Chemistry        Component Value Date/Time     05/04/2021 1126    K 4.6 05/04/2021 1126    CL 98 05/04/2021 1126    CO2 26 05/04/2021 1126    BUN 15 05/04/2021 1126    CREATININE 0.70 05/04/2021 1126        Component Value Date/Time    ALKPHOS 98 05/04/2021 1126    AST 23 05/04/2021 1126    ALT 12 05/04/2021 1126    BILITOT 0.7 05/04/2021 1126          Lab Results   Component Value Date    WBC 4.8 05/04/2021    HGB 14.2 05/04/2021    HCT 40.5 05/04/2021    MCV 90 05/04/2021     05/04/2021     No results found for: IRON, TIBC, FERRITIN  No results found for: SIIHBWRR70  No results found for: FOLATE  No results found for: CRP   No results found for: SEDRATE    Lab Results   Component Value Date    HGBA1C 4.6 (L) 01/03/2020     Lab Results   Component Value Date    MICROALBUR <3.0 03/01/2022    LDLCALC 170 (H) 05/04/2021    CREATININE 0.70 05/04/2021        Lab Results    Component Value Date    TSH 4.200 05/04/2021        No results found for: URICACID    No results found for: PTH, CALCIUM, CAION, PHOS       The following have been reviewed and updated as appropriate in this visit:      Allergies  Meds  Problems  Social History      Patient Active Problem List   Diagnosis   • Migraine   • TIMO on CPAP   • Essential hypertension   • Tinea pedis of right foot   • Seasonal allergies   • Elevated testosterone level   • Elevated TSH   • Gastroesophageal reflux disease   • Carpal tunnel syndrome   • Depressive disorder   • Attention deficit hyperactivity disorder, predominantly inattentive type   • Fatigue due to sleep pattern disturbance   • Daily consumption of alcohol   • Awareness under anesthesia    .    Social History     Tobacco Use   • Smoking status: Current Some Day Smoker     Packs/day: 0.30     Years: 10.00     Pack years: 3.00     Types: Cigarettes   • Smokeless tobacco: Never Used   Vaping Use   • Vaping Use: Never used   Substance Use Topics   • Alcohol use: Yes     Alcohol/week: 2.0 standard drinks     Types: 2 Glasses of wine per week   • Drug use: No       Allergies as of 05/12/2022 - Reviewed 05/12/2022   Allergen Reaction Noted   • Penicillins  12/29/2015         Current Outpatient Medications:   •  amLODIPine (NORVASC) 10 mg tablet, Take 0.5 tablets (5 mg total) by mouth daily., Disp: 90 tablet, Rfl: 0  •  clotrimazole (LOTRIMIN) 1 % topical cream, Apply topically 2 (two) times a day., Disp: 45 g, Rfl: 1  •  cyclobenzaprine (FLEXERIL) 5 mg tablet, TAKE 1 TABLET(5 MG) BY MOUTH TWICE DAILY AS NEEDED FOR MUSCLE SPASMS, Disp: 30 tablet, Rfl: 1  •  loratadine (CLARITIN) 5 mg/5 mL syrup, No SIG Entered, Disp: , Rfl:   •  rizatriptan (MAXALT) 10 mg tablet, rizatriptan 10 mg tablet  One tablet under tongue May be repeated in 2 hs. Wait 72 hours before repeating regimen You can take Excedrin migraine for 72 Hs, before repeating Rizatriptan again., Disp: , Rfl:   •   "traZODone (DESYREL) 50 mg tablet, Take 1 tablet (50 mg total) by mouth nightly., Disp: 90 tablet, Rfl: 3      Review of Systems  Objective   Vitals:   Vitals:    05/12/22 1345 05/12/22 1407   BP: 130/72 122/70   BP Location: Right upper arm    Patient Position: Sitting    Pulse: 86    Temp: 36.8 °C (98.2 °F)    TempSrc: Oral    SpO2: 99%    Weight: 62.3 kg (137 lb 6.4 oz)    Height: 1.6 m (5' 3\")      Body mass index is 24.34 kg/m².    Physical Exam  Constitutional:       General: She is not in acute distress.     Appearance: She is well-developed. She is not diaphoretic.   HENT:      Head: Normocephalic and atraumatic.   Eyes:      General: No scleral icterus.        Right eye: No discharge.         Left eye: No discharge.      Conjunctiva/sclera: Conjunctivae normal.   Neck:      Thyroid: No thyromegaly.      Comments: No thyroid nodules  Cardiovascular:      Rate and Rhythm: Normal rate and regular rhythm.      Heart sounds: Normal heart sounds. No murmur heard.    No friction rub. No gallop.      Comments: No extrasystoles  Pulmonary:      Effort: Pulmonary effort is normal. No respiratory distress.      Breath sounds: Normal breath sounds. No wheezing or rales.   Chest:      Chest wall: No tenderness.   Abdominal:      General: Bowel sounds are normal. There is no distension.      Palpations: Abdomen is soft. There is no mass.      Tenderness: There is no abdominal tenderness.      Comments: No HSM nor bruit   Musculoskeletal:      Cervical back: Neck supple.   Feet:      Right foot:      Toenail Condition: Fungal disease present.  Lymphadenopathy:      Cervical: No cervical adenopathy.   Skin:     General: Skin is warm and dry.      Capillary Refill: Capillary refill takes less than 2 seconds.   Neurological:      Mental Status: She is alert and oriented to person, place, and time.      Cranial Nerves: No cranial nerve deficit.      Motor: No abnormal muscle tone.       ASSESSMENT & PLAN    Risk Surgery: low "   RCRI suspect class 1 risk - no recent Cr on file *  METS: >4     Medically optimized from primary care perspective for upcoming surgery    Problem List Items Addressed This Visit        Circulatory    Essential hypertension (Chronic)     Significant better improvement with recent weight loss  Decrease amlodipine to 5 mg daily  Advised need 3-month follow-up as concerned of her becoming hypotensive with continued weight loss           Relevant Medications    amLODIPine (NORVASC) 10 mg tablet       Infectious/Inflammatory    Tinea pedis of right foot (Chronic)     Recurrence of sx   Restart lamisil            Relevant Medications    clotrimazole (LOTRIMIN) 1 % topical cream       Other    Daily consumption of alcohol     Advised patient on withdrawal symptoms  Advised utilizing CIWA protocol in future she is to be admitted  Regarding cataract surgery, may need larger than usual doses of benzodiazepines given her tolerance             Other Visit Diagnoses     Preop examination    -  Primary    Age-related nuclear cataract of right eye        Tinea pedis of both feet        Relevant Medications    clotrimazole (LOTRIMIN) 1 % topical cream          No orders of the defined types were placed in this encounter.      This note was completed in part utilizing a voice recognition software. Grammatical errors, random word insertion, spelling mistakes, and incomplete sentences may be an occasional consequence of the system secondary to software limitations, ambient noise and hardware issues. Please read the chart carefully and recognize, using context, where substitutions have occurred. If you have any questions or concerns about the context, text or information contained within the body of this dictation, please contact me, the provider, for further clarification.      Bhavya Melchor D.O.  Family Medicine at [facility] 05/13/22

## 2022-05-13 NOTE — ASSESSMENT & PLAN NOTE
Advised patient on withdrawal symptoms  Advised utilizing CIWA protocol in future she is to be admitted  Regarding cataract surgery, may need larger than usual doses of benzodiazepines given her tolerance

## 2022-05-13 NOTE — ASSESSMENT & PLAN NOTE
Significant better improvement with recent weight loss  Decrease amlodipine to 5 mg daily  Advised need 3-month follow-up as concerned of her becoming hypotensive with continued weight loss

## 2022-07-27 ENCOUNTER — TELEPHONE (OUTPATIENT)
Dept: FAMILY MEDICINE | Facility: CLINIC | Age: 52
End: 2022-07-27
Payer: COMMERCIAL

## 2022-07-27 NOTE — TELEPHONE ENCOUNTER
Patient called and left voicemail.  She would like to clarify the last script for amLODipine.  Patient called [facility] to switch scripts to mail order and [facility] declined the script for amLODipine.  Is trying to figure out why is was declined.

## 2022-09-30 ENCOUNTER — APPOINTMENT (OUTPATIENT)
Dept: URBAN - METROPOLITAN AREA CLINIC 203 | Age: 52
Setting detail: DERMATOLOGY
End: 2022-09-30

## 2022-09-30 DIAGNOSIS — L72.0 EPIDERMAL CYST: ICD-10-CM

## 2022-09-30 DIAGNOSIS — L82.0 INFLAMED SEBORRHEIC KERATOSIS: ICD-10-CM

## 2022-09-30 DIAGNOSIS — D22 MELANOCYTIC NEVI: ICD-10-CM

## 2022-09-30 DIAGNOSIS — L30.1 DYSHIDROSIS [POMPHOLYX]: ICD-10-CM

## 2022-09-30 PROBLEM — D22.5 MELANOCYTIC NEVI OF TRUNK: Status: ACTIVE | Noted: 2022-09-30

## 2022-09-30 PROCEDURE — OTHER COUNSELING: OTHER

## 2022-09-30 PROCEDURE — OTHER LIQUID NITROGEN: OTHER

## 2022-09-30 PROCEDURE — 17110 DESTRUCT B9 LESION 1-14: CPT

## 2022-09-30 PROCEDURE — 99203 OFFICE O/P NEW LOW 30 MIN: CPT | Mod: 25

## 2022-09-30 PROCEDURE — OTHER PRESCRIPTION: OTHER

## 2022-09-30 RX ORDER — BETAMETHASONE DIPROPIONATE 0.5 MG/G
OINTMENT, AUGMENTED TOPICAL
Qty: 50 | Refills: 4 | Status: ERX | COMMUNITY
Start: 2022-09-30

## 2022-09-30 ASSESSMENT — LOCATION DETAILED DESCRIPTION DERM
LOCATION DETAILED: LEFT LATERAL PROXIMAL UPPER ARM
LOCATION DETAILED: RIGHT LATERAL DORSAL FOOT
LOCATION DETAILED: LEFT LATERAL ABDOMEN
LOCATION DETAILED: GLABELLA
LOCATION DETAILED: LEFT MEDIAL PLANTAR MIDFOOT
LOCATION DETAILED: RIGHT LATERAL PROXIMAL UPPER ARM
LOCATION DETAILED: RIGHT MEDIAL PLANTAR MIDFOOT

## 2022-09-30 ASSESSMENT — LOCATION SIMPLE DESCRIPTION DERM
LOCATION SIMPLE: GLABELLA
LOCATION SIMPLE: LEFT UPPER ARM
LOCATION SIMPLE: RIGHT UPPER ARM
LOCATION SIMPLE: LEFT PLANTAR SURFACE
LOCATION SIMPLE: RIGHT FOOT
LOCATION SIMPLE: ABDOMEN
LOCATION SIMPLE: RIGHT PLANTAR SURFACE

## 2022-09-30 ASSESSMENT — LOCATION ZONE DERM
LOCATION ZONE: FACE
LOCATION ZONE: ARM
LOCATION ZONE: FEET
LOCATION ZONE: TRUNK

## 2022-09-30 NOTE — PROCEDURE: LIQUID NITROGEN
Detail Level: Detailed
Show Spray Paint Technique Variable?: Yes
Medical Necessity Clause: This procedure was medically necessary because the lesions that were treated were:
Spray Paint Text: The liquid nitrogen was applied to the skin utilizing a spray paint frosting technique.
Add 52 Modifier (Optional): no
Post-Care Instructions: I reviewed with the patient in detail post-care instructions. Patient is to wear sunprotection, and avoid picking at any of the treated lesions. Pt may apply Vaseline to crusted or scabbing areas.
Consent: The patient's consent was obtained including but not limited to risks of crusting, scabbing, blistering, scarring, darker or lighter pigmentary change, recurrence, incomplete removal and infection.
Medical Necessity Information: It is in your best interest to select a reason for this procedure from the list below. All of these items fulfill various CMS LCD requirements except the new and changing color options.

## 2023-05-17 ENCOUNTER — RX ONLY (RX ONLY)
Age: 53
End: 2023-05-17

## 2023-05-17 RX ORDER — BETAMETHASONE DIPROPIONATE 0.5 MG/G
OINTMENT, AUGMENTED TOPICAL
Qty: 50 | Refills: 4 | Status: ERX

## 2023-10-18 ENCOUNTER — OFFICE VISIT (OUTPATIENT)
Dept: FAMILY MEDICINE | Facility: CLINIC | Age: 53
End: 2023-10-18
Payer: COMMERCIAL

## 2023-10-18 VITALS
WEIGHT: 144.2 LBS | BODY MASS INDEX: 25.54 KG/M2 | HEART RATE: 89 BPM | SYSTOLIC BLOOD PRESSURE: 172 MMHG | OXYGEN SATURATION: 98 % | RESPIRATION RATE: 17 BRPM | TEMPERATURE: 97.8 F | DIASTOLIC BLOOD PRESSURE: 92 MMHG

## 2023-10-18 DIAGNOSIS — I10 ESSENTIAL HYPERTENSION: Primary | ICD-10-CM

## 2023-10-18 PROCEDURE — 99214 OFFICE O/P EST MOD 30 MIN: CPT | Performed by: STUDENT IN AN ORGANIZED HEALTH CARE EDUCATION/TRAINING PROGRAM

## 2023-10-18 PROCEDURE — 3077F SYST BP >= 140 MM HG: CPT | Performed by: STUDENT IN AN ORGANIZED HEALTH CARE EDUCATION/TRAINING PROGRAM

## 2023-10-18 PROCEDURE — 3080F DIAST BP >= 90 MM HG: CPT | Performed by: STUDENT IN AN ORGANIZED HEALTH CARE EDUCATION/TRAINING PROGRAM

## 2023-10-18 PROCEDURE — 3008F BODY MASS INDEX DOCD: CPT | Performed by: STUDENT IN AN ORGANIZED HEALTH CARE EDUCATION/TRAINING PROGRAM

## 2023-10-18 RX ORDER — AMLODIPINE BESYLATE 10 MG/1
5 TABLET ORAL DAILY
Qty: 90 TABLET | Refills: 0 | Status: SHIPPED | OUTPATIENT
Start: 2023-10-18 | End: 2023-11-01 | Stop reason: SDUPTHER

## 2023-10-18 RX ORDER — AMLODIPINE BESYLATE 10 MG/1
5 TABLET ORAL DAILY
Qty: 90 TABLET | Refills: 0 | Status: SHIPPED | OUTPATIENT
Start: 2023-10-18 | End: 2023-10-18 | Stop reason: SDUPTHER

## 2023-10-18 ASSESSMENT — ENCOUNTER SYMPTOMS
CARDIOVASCULAR NEGATIVE: 1
PSYCHIATRIC NEGATIVE: 1
GASTROINTESTINAL NEGATIVE: 1
EYES NEGATIVE: 1
MUSCULOSKELETAL NEGATIVE: 1
NEUROLOGICAL NEGATIVE: 1
ACTIVITY CHANGE: 1
RESPIRATORY NEGATIVE: 1

## 2023-10-18 ASSESSMENT — PATIENT HEALTH QUESTIONNAIRE - PHQ9: SUM OF ALL RESPONSES TO PHQ9 QUESTIONS 1 & 2: 0

## 2023-10-18 NOTE — PROGRESS NOTES
"Subjective      Patient ID: Cayla Burris is a 53 y.o. female here for the following:   Annual Exam      HPI    SUBJECTIVE:   53 y.o. female for follow up on BP measurements. She does not record home BP and has not taken Amlodipine in \"a long time\" as she needs a refill. Pt also reports that she fell and fractured her pubic bone this summer and was out of work for 3 weeks.    Caffeine intake: about 24 oz of coffee  Exercise program: Goes to gym  No migraines      Immunization History   Administered Date(s) Administered    Influenza Vaccine Quadrivalent Preservative Free 6 Mons and Up 2021    Influenza, Unspecified 2021    SARS-COV-2 (COVID-19) VACCINE, MODERNA MONOVALENT 2021, 2022    Zoster Vaccine Recombinant Adjuvanted (Shingrix) 2021, 2022      Menstrual History:  OB History        3    Para   3    Term   3            AB        Living   3       SAB        IAB        Ectopic        Multiple        Live Births   3                No LMP recorded. Patient is perimenopausal.           Chemistry        Component Value Date/Time     10/06/2023 1127    K 5.3 (H) 10/06/2023 1127     10/06/2023 1127    CO2 26 10/06/2023 1127    BUN 19 10/06/2023 1127    CREATININE 0.63 10/06/2023 1127        Component Value Date/Time    ALKPHOS 106 10/06/2023 1127    AST 20 10/06/2023 1127    ALT 17 10/06/2023 1127    BILITOT 0.5 10/06/2023 1127          Lab Results   Component Value Date    WBC 5.8 10/06/2023    HGB 14.9 10/06/2023    HCT 43.1 10/06/2023    MCV 91 10/06/2023     10/06/2023       Lab Results   Component Value Date    HGBA1C 4.6 (L) 2020     Lab Results   Component Value Date    CHOL 235 (H) 10/06/2023    CHOL 262 (H) 2021     Lab Results   Component Value Date    HDL 69 10/06/2023    HDL 68 2021     Lab Results   Component Value Date    LDLCALC 130 (H) 10/06/2023    LDLCALC 170 (H) 2021     Lab Results   Component Value Date    " "TRIG 207 (H) 10/06/2023    TRIG 137 05/04/2021     No results found for: \"CHOLHDL\"    The 10-year ASCVD risk score (Adriana CHAVEZ, et al., 2019) is: 9%    Values used to calculate the score:      Age: 53 years      Sex: Female      Is Non- : No      Diabetic: No      Tobacco smoker: Yes      Systolic Blood Pressure: 172 mmHg      Is BP treated: Yes      HDL Cholesterol: 69 mg/dL      Total Cholesterol: 235 mg/dL    The following have been reviewed and updated as appropriate in this visit:      Allergies  Meds  Problems  Social History      Patient Active Problem List   Diagnosis    Migraine    TIMO on CPAP    Essential hypertension    Tinea pedis of right foot    Seasonal allergies    Elevated testosterone level    Elevated TSH    Gastroesophageal reflux disease    Carpal tunnel syndrome    Depressive disorder    Attention deficit hyperactivity disorder, predominantly inattentive type    Fatigue due to sleep pattern disturbance    Daily consumption of alcohol    Awareness under anesthesia    .    Social History     Tobacco Use    Smoking status: Some Days     Packs/day: 0.30     Years: 10.00     Additional pack years: 0.00     Total pack years: 3.00     Types: Cigarettes    Smokeless tobacco: Never   Vaping Use    Vaping Use: Never used   Substance Use Topics    Alcohol use: Yes     Alcohol/week: 2.0 standard drinks of alcohol     Types: 2 Glasses of wine per week    Drug use: No     Social History     Social History Narrative    Not on file       Family History   Problem Relation Age of Onset    No Known Problems Biological Father     No Known Problems Biological Mother     No Known Problems Biological Sister        Allergies as of 10/18/2023 - Reviewed 10/18/2023   Allergen Reaction Noted    Penicillins  12/29/2015       Current Outpatient Medications   Medication Sig Dispense Refill    amLODIPine (NORVASC) 10 mg tablet Take 0.5 tablets (5 mg total) by mouth daily. 90 " tablet 0    cyclobenzaprine (FLEXERIL) 5 mg tablet TAKE 1 TABLET(5 MG) BY MOUTH TWICE DAILY AS NEEDED FOR MUSCLE SPASMS 30 tablet 1    loratadine (CLARITIN) 5 mg/5 mL syrup No SIG Entered      traZODone (DESYREL) 50 mg tablet Take 1 tablet (50 mg total) by mouth nightly. 90 tablet 3    rizatriptan (MAXALT) 10 mg tablet rizatriptan 10 mg tablet   One tablet under tongue May be repeated in 2 hs. Wait 72 hours before repeating regimen You can take Excedrin migraine for 72 Hs, before repeating Rizatriptan again.       No current facility-administered medications for this visit.         Review of Systems   Constitutional: Positive for activity change (pubic bone fracture).   HENT: Negative.    Eyes: Negative.    Respiratory: Negative.    Cardiovascular: Negative.    Gastrointestinal: Negative.    Genitourinary: Negative.    Musculoskeletal: Negative.    Skin: Negative.    Neurological: Negative.    Psychiatric/Behavioral: Negative.          Objective   Vitals:   Vitals:    10/18/23 1547   BP: (!) 172/92   BP Location: Left upper arm   Patient Position: Sitting   Pulse: 89   Resp: 17   Temp: 36.6 °C (97.8 °F)   SpO2: 98%   Weight: 65.4 kg (144 lb 3.2 oz)     Body mass index is 25.54 kg/m².  No LMP recorded. Patient is perimenopausal.    No results found.    Physical Exam  Constitutional:       General: She is not in acute distress.     Appearance: She is well-developed. She is not diaphoretic.   HENT:      Head: Normocephalic and atraumatic.   Eyes:      General: No scleral icterus.        Right eye: No discharge.         Left eye: No discharge.      Conjunctiva/sclera: Conjunctivae normal.   Neck:      Thyroid: No thyromegaly.      Comments: No thyroid nodules  Cardiovascular:      Rate and Rhythm: Normal rate and regular rhythm.      Heart sounds: Normal heart sounds. No murmur heard.     No friction rub. No gallop.      Comments: No extrasystoles  Pulmonary:      Effort: Pulmonary effort is normal. No respiratory  distress.      Breath sounds: Normal breath sounds. No wheezing or rales.   Chest:      Chest wall: No tenderness.   Abdominal:      General: Bowel sounds are normal. There is no distension.      Palpations: Abdomen is soft. There is no mass.      Tenderness: There is no abdominal tenderness.      Comments: No HSM nor bruit   Musculoskeletal:      Cervical back: Neck supple.   Lymphadenopathy:      Cervical: No cervical adenopathy.   Skin:     General: Skin is warm and dry.      Capillary Refill: Capillary refill takes less than 2 seconds.   Neurological:      Mental Status: She is alert and oriented to person, place, and time.      Cranial Nerves: No cranial nerve deficit.      Motor: No abnormal muscle tone.       ASSESSMENT & PLAN    Problem List Items Addressed This Visit        Circulatory    Essential hypertension - Primary (Chronic)     HCM: deferred full CPE until after BP is controlled.   RTO in 2 weeks for BP check.         Relevant Medications    amLODIPine (NORVASC) 10 mg tablet    Other Relevant Orders    Microalbumin/Creatinine Ur Random     By signing my name below, I, Jeniffer Menezes and Cecilia Li, attest that this documentation has been prepared under the direction and in the presence of Bhavya Morales DO      10/18/2023 5:12 PM   Jeniffer Menezes and Cecilia Li  This note was completed in part utilizing a voice recognition software and in part with a scribe. Grammatical errors, random word insertion, spelling mistakes, and incomplete sentences may be an occasional consequence of the system secondary to software limitations, ambient noise and hardware issues. Please read the chart carefully and recognize, using context, where substitutions have occurred. If you have any questions or concerns about the context, text or information contained within the body of this dictation, please contact me, the provider, for further clarification.      Bhavya Melchor D.O.  Family  Medicine at [facility] 10/18/23

## 2023-10-19 LAB
ALBUMIN/CREAT UR: <6 MG/G CREAT (ref 0–29)
CREAT UR-MCNC: 53.9 MG/DL
MICROALBUMIN UR-MCNC: <3 UG/ML

## 2023-10-31 NOTE — PROGRESS NOTES
[facility]      Reason for visit:   Chief Complaint   Patient presents with    Follow-up      Cayla Burris is a 53 y.o. female who presents for     Cayla is a 53 year old female pt of Dr SAMPSON- here for BP check and EKG  Amlodipine refilled at last visit, had been off for awhile   Now taking 5mg amlodipine    BP Readings from Last 3 Encounters:  11/01/23 : 140/74  10/18/23 : (!) 172/92  05/12/22 : 122/70    Pt reports no side effects to amlodipine.     States she does notice 3 days ago bilateral neck pain- states she took advil and this improved. Denies arm pain, chest pain, or shortness of breath.     Congested at the moment, when pointing to her neck pain points right below her ears.                   Past Medical History:   Diagnosis Date    Awareness under anesthesia     Essential hypertension     Migraine     TIMO on CPAP     Seasonal allergies     Tobacco use        Past Surgical History:   Procedure Laterality Date    BREAST LUMPECTOMY  2002    BUNIONECTOMY Left     1998    HERNIA REPAIR      VAGINAL DELIVERY      x 3       Social History     Tobacco Use    Smoking status: Some Days     Packs/day: 0.30     Years: 10.00     Additional pack years: 0.00     Total pack years: 3.00     Types: Cigarettes    Smokeless tobacco: Never   Vaping Use    Vaping Use: Never used   Substance Use Topics    Alcohol use: Yes     Alcohol/week: 2.0 standard drinks of alcohol     Types: 2 Glasses of wine per week    Drug use: No       Family History   Problem Relation Age of Onset    No Known Problems Biological Father     No Known Problems Biological Mother     No Known Problems Biological Sister        Penicillins      Current Outpatient Medications:     amLODIPine (NORVASC) 5 mg tablet, Take 1 tablet (5 mg total) by mouth daily., Disp: 90 tablet, Rfl: 1    cyclobenzaprine (FLEXERIL) 5 mg tablet, TAKE 1 TABLET(5 MG) BY MOUTH TWICE DAILY AS NEEDED FOR MUSCLE SPASMS, Disp: 30 tablet, Rfl: 1    loratadine (CLARITIN) 5  mg/5 mL syrup, No SIG Entered, Disp: , Rfl:     rizatriptan (MAXALT) 10 mg tablet, rizatriptan 10 mg tablet  One tablet under tongue May be repeated in 2 hs. Wait 72 hours before repeating regimen You can take Excedrin migraine for 72 Hs, before repeating Rizatriptan again., Disp: , Rfl:     traZODone (DESYREL) 50 mg tablet, Take 1 tablet (50 mg total) by mouth nightly., Disp: 90 tablet, Rfl: 3    Review of Systems   HENT: Positive for congestion.    All other systems reviewed and are negative.      Objective   Vitals:    11/01/23 1544 11/01/23 1557   BP: 140/74 120/70   BP Location: Left upper arm    Patient Position: Sitting    Pulse: 79    Resp: 17    Temp: 36.6 °C (97.9 °F)    SpO2: 99%    Weight: 65.2 kg (143 lb 12.8 oz)      Body mass index is 25.47 kg/m².    Physical Exam  Constitutional:       Appearance: She is well-developed.   HENT:      Head: Normocephalic and atraumatic.      Right Ear: Ear canal and external ear normal. Tympanic membrane is erythematous.      Left Ear: Ear canal and external ear normal. Tympanic membrane is erythematous.      Ears:      Comments: No bulging, mild erythema  nontender ears on exam  +congestion       Nose: Congestion present.      Mouth/Throat:      Pharynx: Oropharynx is clear. No oropharyngeal exudate or posterior oropharyngeal erythema.   Eyes:      Conjunctiva/sclera: Conjunctivae normal.      Pupils: Pupils are equal, round, and reactive to light.   Neck:      Thyroid: No thyromegaly.      Vascular: No carotid bruit.   Cardiovascular:      Rate and Rhythm: Normal rate and regular rhythm.      Heart sounds: Normal heart sounds. No murmur heard.  Pulmonary:      Effort: Pulmonary effort is normal. No respiratory distress.      Breath sounds: Normal breath sounds. No wheezing or rales.   Musculoskeletal:         General: Normal range of motion.      Cervical back: Normal range of motion. No rigidity.   Lymphadenopathy:      Cervical: No cervical adenopathy.   Skin:      General: Skin is warm and dry.      Capillary Refill: Capillary refill takes less than 2 seconds.      Findings: No rash.   Neurological:      Mental Status: She is alert and oriented to person, place, and time.      Cranial Nerves: No cranial nerve deficit.      Deep Tendon Reflexes: Reflexes normal.   Psychiatric:         Behavior: Behavior normal.         Thought Content: Thought content normal.         Judgment: Judgment normal.         Lab Results   Component Value Date    WBC 5.8 10/06/2023    HGB 14.9 10/06/2023    HCT 43.1 10/06/2023     10/06/2023    CHOL 235 (H) 10/06/2023    TRIG 207 (H) 10/06/2023    HDL 69 10/06/2023    ALT 17 10/06/2023    AST 20 10/06/2023     10/06/2023    K 5.3 (H) 10/06/2023     10/06/2023    CREATININE 0.63 10/06/2023    BUN 19 10/06/2023    CO2 26 10/06/2023    TSH 2.780 10/06/2023    HGBA1C 4.6 (L) 01/03/2020    MICROALBUR <3.0 10/18/2023                   Assessment   Problem List Items Addressed This Visit        Nervous    Neck pain     Suspect due to sinus, sinus congestion on exam and erythematous TMs however nontender ears per pt   EKG obtained- incomplete RBBB, pt asymptomatic, no dizziness, lightheadedness, chest pain, or shortness of breath  Also with left atrial enlargement, however working on controlling BP   I advised pt if she begins to have chest pain, shortness of breath, dizziness, she should go to hospital   No family history of heart disease and therefore I believe bp control and cho control sufficient for now.   Advise flonase and claritin for sinus congestion          Relevant Orders    ECG 12 LEAD OFFICE PERFORMED (Completed)       Circulatory    Essential hypertension (Chronic)     Controlled today   Taking amlodipine 5mg daily  I advised pt to continue with current medication and continue to check BP at home  If over 140/90 please reach out  Any red flag symptoms go to ED- none present today          Relevant Medications    amLODIPine  (NORVASC) 5 mg tablet           YVONNE Bell CRNP  11/1/2023

## 2023-11-01 ENCOUNTER — OFFICE VISIT (OUTPATIENT)
Dept: FAMILY MEDICINE | Facility: CLINIC | Age: 53
End: 2023-11-01
Payer: COMMERCIAL

## 2023-11-01 VITALS
RESPIRATION RATE: 17 BRPM | SYSTOLIC BLOOD PRESSURE: 120 MMHG | DIASTOLIC BLOOD PRESSURE: 70 MMHG | WEIGHT: 143.8 LBS | BODY MASS INDEX: 25.47 KG/M2 | OXYGEN SATURATION: 99 % | TEMPERATURE: 97.9 F | HEART RATE: 79 BPM

## 2023-11-01 DIAGNOSIS — M54.2 NECK PAIN: ICD-10-CM

## 2023-11-01 DIAGNOSIS — I10 ESSENTIAL HYPERTENSION: ICD-10-CM

## 2023-11-01 PROCEDURE — 3074F SYST BP LT 130 MM HG: CPT

## 2023-11-01 PROCEDURE — 3078F DIAST BP <80 MM HG: CPT

## 2023-11-01 PROCEDURE — 93000 ELECTROCARDIOGRAM COMPLETE: CPT

## 2023-11-01 PROCEDURE — 3008F BODY MASS INDEX DOCD: CPT

## 2023-11-01 PROCEDURE — 99214 OFFICE O/P EST MOD 30 MIN: CPT

## 2023-11-01 RX ORDER — AMLODIPINE BESYLATE 5 MG/1
5 TABLET ORAL DAILY
Qty: 90 TABLET | Refills: 1 | Status: SHIPPED | OUTPATIENT
Start: 2023-11-01 | End: 2024-01-03 | Stop reason: SDUPTHER

## 2023-11-01 NOTE — ASSESSMENT & PLAN NOTE
Controlled today   Taking amlodipine 5mg daily  I advised pt to continue with current medication and continue to check BP at home  If over 140/90 please reach out  Any red flag symptoms go to ED- none present today

## 2023-11-01 NOTE — ASSESSMENT & PLAN NOTE
Suspect due to sinus, sinus congestion on exam and erythematous TMs however nontender ears per pt   EKG obtained- incomplete RBBB, pt asymptomatic, no dizziness, lightheadedness, chest pain, or shortness of breath  Also with left atrial enlargement, however working on controlling BP   I advised pt if she begins to have chest pain, shortness of breath, dizziness, she should go to hospital   No family history of heart disease and therefore I believe bp control and cho control sufficient for now.   Advise flonase and claritin for sinus congestion

## 2024-01-02 ENCOUNTER — TELEPHONE (OUTPATIENT)
Dept: FAMILY MEDICINE | Facility: CLINIC | Age: 54
End: 2024-01-02
Payer: COMMERCIAL

## 2024-01-02 DIAGNOSIS — I10 ESSENTIAL HYPERTENSION: ICD-10-CM

## 2024-01-02 NOTE — TELEPHONE ENCOUNTER
[facility] needs someone to call and clarify if pt should be on Amlodipine 5 or 10mg as they have scripts for both    [phone] opt2  Ref#[id number]

## 2024-01-03 RX ORDER — AMLODIPINE BESYLATE 5 MG/1
5 TABLET ORAL DAILY
Qty: 90 TABLET | Refills: 1 | Status: SHIPPED | OUTPATIENT
Start: 2024-01-03 | End: 2024-03-18 | Stop reason: SDUPTHER

## 2024-01-03 NOTE — TELEPHONE ENCOUNTER
Diagnoses and all orders for this visit:    Essential hypertension  -     amLODIPine (NORVASC) 5 mg tablet; Take 1 tablet (5 mg total) by mouth daily.

## 2024-01-30 RX ORDER — CYCLOBENZAPRINE HCL 5 MG
5 TABLET ORAL DAILY PRN
Qty: 30 TABLET | Refills: 1 | Status: SHIPPED | OUTPATIENT
Start: 2024-01-30

## 2024-01-30 NOTE — TELEPHONE ENCOUNTER
Medicine Refill Request    Last Office Visit: 11/1/2023   Last Consult Visit: 5/12/2022  Last Telemedicine Visit: Visit date not found    Next Appointment: 3/18/2024      Current Outpatient Medications:   •  amLODIPine (NORVASC) 5 mg tablet, Take 1 tablet (5 mg total) by mouth daily., Disp: 90 tablet, Rfl: 1  •  cyclobenzaprine (FLEXERIL) 5 mg tablet, TAKE 1 TABLET(5 MG) BY MOUTH TWICE DAILY AS NEEDED FOR MUSCLE SPASMS, Disp: 30 tablet, Rfl: 1  •  loratadine (CLARITIN) 5 mg/5 mL syrup, No SIG Entered, Disp: , Rfl:   •  rizatriptan (MAXALT) 10 mg tablet, rizatriptan 10 mg tablet  One tablet under tongue May be repeated in 2 hs. Wait 72 hours before repeating regimen You can take Excedrin migraine for 72 Hs, before repeating Rizatriptan again., Disp: , Rfl:   •  traZODone (DESYREL) 50 mg tablet, Take 1 tablet (50 mg total) by mouth nightly., Disp: 90 tablet, Rfl: 3      BP Readings from Last 3 Encounters:   11/01/23 120/70   10/18/23 (!) 172/92   05/12/22 122/70       Recent Lab results:  Lab Results   Component Value Date    CHOL 235 (H) 10/06/2023   ,   Lab Results   Component Value Date    HDL 69 10/06/2023   ,   Lab Results   Component Value Date    LDLCALC 130 (H) 10/06/2023   ,   Lab Results   Component Value Date    TRIG 207 (H) 10/06/2023        Lab Results   Component Value Date    GLUCOSE 100 (H) 10/06/2023   ,   Lab Results   Component Value Date    HGBA1C 4.6 (L) 01/03/2020         Lab Results   Component Value Date    CREATININE 0.63 10/06/2023       Lab Results   Component Value Date    TSH 2.780 10/06/2023           Lab Results   Component Value Date    HGBA1C 4.6 (L) 01/03/2020

## 2024-03-08 ENCOUNTER — HOSPITAL ENCOUNTER (OUTPATIENT)
Dept: RADIOLOGY | Age: 54
Discharge: HOME | End: 2024-03-08
Attending: STUDENT IN AN ORGANIZED HEALTH CARE EDUCATION/TRAINING PROGRAM
Payer: COMMERCIAL

## 2024-03-08 DIAGNOSIS — R92.8 ABNORMAL MAMMOGRAM: ICD-10-CM

## 2024-03-08 PROCEDURE — G0279 TOMOSYNTHESIS, MAMMO: HCPCS

## 2024-03-14 NOTE — PATIENT INSTRUCTIONS
Keep up the good work!  1 day a week blood pressure reading   1 day a week at [facility]   Return in 6 weeks for next BP check    Helpful Tips for Managing Your High Blood Pressure!    · Regular exercise: 150 minutes moderate to high intentsity weekly. Discuss new regimens with your doctor.   · We recommend eating what's called the DASH diet, which:  · Emphasizes vegetables, fruits, and whole grains!  · Try to limit red meat, sweets and salt!  · Limit salt intake to 2,400mg per day  · Do NOT add extra salt to your foods!  · To add flavor to your food, try adding things like lemon juice, or hot pepper flakes and other herbs  · Read labels on prepackaged foods, and choose low-salt or low-sodium foods  · You can use an giovanna like [employer] to help with this.   · QUIT SMOKING!  · We can help!  · Alcohol consumption:  · Limit this to 2x a day for men, or 1x a day for women  · Manage stress using techniques like mindfulness and progressive muscle relaxation   · Measure your blood pressure at home with a blood pressure cuff a few times a month. Keep a log of the values   Patient Education   Recombinant Zoster (Shingles) Vaccine: What You Need to Know  1. Why get vaccinated?  Recombinant zoster (shingles) vaccine can prevent shingles.  Shingles (also called herpes zoster, or just zoster) is a painful skin rash, usually with blisters. In addition to the rash, shingles can cause fever, headache, chills, or upset stomach. More rarely, shingles can lead to pneumonia, hearing problems, blindness, brain inflammation (encephalitis), or death.  The most common complication of shingles is long-term nerve pain called postherpetic neuralgia (PHN). PHN occurs in the areas where the shingles rash was, even after the rash clears up. It can last for months or years after the rash goes away. The pain from PHN can be severe and debilitating.  About 10 to 18% of people who get shingles will experience PHN. The risk of PHN increases with age. An  older adult with shingles is more likely to develop PHN and have longer lasting and more severe pain than a younger person with shingles.  Shingles is caused by the varicella zoster virus, the same virus that causes chickenpox. After you have chickenpox, the virus stays in your body and can cause shingles later in life. Shingles cannot be passed from one person to another, but the virus that causes shingles can spread and cause chickenpox in someone who had never had chickenpox or received chickenpox vaccine.  2. Recombinant shingles vaccine  Recombinant shingles vaccine provides strong protection against shingles. By preventing shingles, recombinant shingles vaccine also protects against PHN.  Recombinant shingles vaccine is the preferred vaccine for the prevention of shingles. However, a different vaccine, live shingles vaccine, may be used in some circumstances.  The recombinant shingles vaccine is recommended for adults 50 years and older without serious immune problems. It is given as a two-dose series.  This vaccine is also recommended for people who have already gotten another type of shingles vaccine, the live shingles vaccine. There is no live virus in this vaccine.  Shingles vaccine may be given at the same time as other vaccines.  3. Talk with your health care provider  Tell your vaccine provider if the person getting the vaccine:  Has had an allergic reaction after a previous dose of recombinant shingles vaccine, or has any severe, life-threatening allergies.  Is pregnant or breastfeeding.  Is currently experiencing an episode of shingles.  In some cases, your health care provider may decide to postpone shingles vaccination to a future visit.  People with minor illnesses, such as a cold, may be vaccinated. People who are moderately or severely ill should usually wait until they recover before getting recombinant shingles vaccine.  Your health care provider can give you more information.  4. Risks of a  vaccine reaction  A sore arm with mild or moderate pain is very common after recombinant shingles vaccine, affecting about 80% of vaccinated people. Redness and swelling can also happen at the site of the injection.  Tiredness, muscle pain, headache, shivering, fever, stomach pain, and nausea happen after vaccination in more than half of people who receive recombinant shingles vaccine.  In clinical trials, about 1 out of 6 people who got recombinant zoster vaccine experienced side effects that prevented them from doing regular activities. Symptoms usually went away on their own in 2 to 3 days.  You should still get the second dose of recombinant zoster vaccine even if you had one of these reactions after the first dose.  People sometimes faint after medical procedures, including vaccination. Tell your provider if you feel dizzy or have vision changes or ringing in the ears.  As with any medicine, there is a very remote chance of a vaccine causing a severe allergic reaction, other serious injury, or death.  5. What if there is a serious problem?  An allergic reaction could occur after the vaccinated person leaves the clinic. If you see signs of a severe allergic reaction (hives, swelling of the face and throat, difficulty breathing, a fast heartbeat, dizziness, or weakness), call 9-1-1 and get the person to the nearest hospital.  For other signs that concern you, call your health care provider.  Adverse reactions should be reported to the Vaccine Adverse Event Reporting System (VAERS). Your health care provider will usually file this report, or you can do it yourself. Visit the VAERS website at www.vaers.hhs.gov or call 1-824.832.7121. VAERS is only for reporting reactions, and VAERS staff do not give medical advice.  6. How can I learn more?  Ask your health care provider.  Call your local or state health department.  Contact the Centers for Disease Control and Prevention (CDC):  Call 1-666.592.7395 (6-036-UKK-INFO)  or  Visit CDC's website at www.cdc.gov/vaccines  Vaccine Information Statement Recombinant Zoster Vaccine (10/30/2019)  This information is not intended to replace advice given to you by your health care provider. Make sure you discuss any questions you have with your health care provider.  Document Revised: 12/09/2020 Document Reviewed: 12/09/2020  Elsevier Patient Education © 2021 [employer]        Quality 226: Preventive Care And Screening: Tobacco Use: Screening And Cessation Intervention: Patient screened for tobacco use and is an ex/non-smoker Quality 431: Preventive Care And Screening: Unhealthy Alcohol Use - Screening: Patient not identified as an unhealthy alcohol user when screened for unhealthy alcohol use using a systematic screening method Detail Level: Detailed

## 2024-03-18 ENCOUNTER — OFFICE VISIT (OUTPATIENT)
Dept: FAMILY MEDICINE | Facility: CLINIC | Age: 54
End: 2024-03-18
Payer: COMMERCIAL

## 2024-03-18 VITALS
HEIGHT: 64 IN | HEART RATE: 86 BPM | DIASTOLIC BLOOD PRESSURE: 76 MMHG | WEIGHT: 143.2 LBS | BODY MASS INDEX: 24.45 KG/M2 | OXYGEN SATURATION: 99 % | SYSTOLIC BLOOD PRESSURE: 128 MMHG | TEMPERATURE: 97.2 F

## 2024-03-18 DIAGNOSIS — N93.9 ABNORMAL UTERINE BLEEDING: ICD-10-CM

## 2024-03-18 DIAGNOSIS — Z00.00 ENCOUNTER FOR PREVENTIVE CARE: Primary | ICD-10-CM

## 2024-03-18 DIAGNOSIS — R79.89 ELEVATED TESTOSTERONE LEVEL: Chronic | ICD-10-CM

## 2024-03-18 DIAGNOSIS — R79.89 ELEVATED TSH: Chronic | ICD-10-CM

## 2024-03-18 DIAGNOSIS — G47.9 FATIGUE DUE TO SLEEP PATTERN DISTURBANCE: ICD-10-CM

## 2024-03-18 DIAGNOSIS — F90.0 ATTENTION DEFICIT HYPERACTIVITY DISORDER, PREDOMINANTLY INATTENTIVE TYPE: ICD-10-CM

## 2024-03-18 DIAGNOSIS — G43.009 MIGRAINE WITHOUT AURA, NOT INTRACTABLE, WITHOUT STATUS MIGRAINOSUS: ICD-10-CM

## 2024-03-18 DIAGNOSIS — F32.A DEPRESSIVE DISORDER: ICD-10-CM

## 2024-03-18 DIAGNOSIS — R53.83 FATIGUE DUE TO SLEEP PATTERN DISTURBANCE: ICD-10-CM

## 2024-03-18 DIAGNOSIS — K21.9 GASTROESOPHAGEAL REFLUX DISEASE, UNSPECIFIED WHETHER ESOPHAGITIS PRESENT: ICD-10-CM

## 2024-03-18 DIAGNOSIS — I10 ESSENTIAL HYPERTENSION: Chronic | ICD-10-CM

## 2024-03-18 PROBLEM — R92.8 ABNORMAL FINDINGS ON DIAGNOSTIC IMAGING OF BREAST: Status: ACTIVE | Noted: 2024-03-18

## 2024-03-18 PROBLEM — F32.9 MAJOR DEPRESSIVE DISORDER, SINGLE EPISODE, UNSPECIFIED: Status: ACTIVE | Noted: 2021-11-09

## 2024-03-18 PROCEDURE — 90471 IMMUNIZATION ADMIN: CPT | Performed by: STUDENT IN AN ORGANIZED HEALTH CARE EDUCATION/TRAINING PROGRAM

## 2024-03-18 PROCEDURE — 99396 PREV VISIT EST AGE 40-64: CPT | Mod: 25 | Performed by: STUDENT IN AN ORGANIZED HEALTH CARE EDUCATION/TRAINING PROGRAM

## 2024-03-18 PROCEDURE — 90715 TDAP VACCINE 7 YRS/> IM: CPT | Performed by: STUDENT IN AN ORGANIZED HEALTH CARE EDUCATION/TRAINING PROGRAM

## 2024-03-18 PROCEDURE — 99214 OFFICE O/P EST MOD 30 MIN: CPT | Mod: 25 | Performed by: STUDENT IN AN ORGANIZED HEALTH CARE EDUCATION/TRAINING PROGRAM

## 2024-03-18 PROCEDURE — 3008F BODY MASS INDEX DOCD: CPT | Performed by: STUDENT IN AN ORGANIZED HEALTH CARE EDUCATION/TRAINING PROGRAM

## 2024-03-18 RX ORDER — TRAZODONE HYDROCHLORIDE 50 MG/1
50 TABLET ORAL NIGHTLY
Qty: 90 TABLET | Refills: 3 | Status: SHIPPED | OUTPATIENT
Start: 2024-03-18 | End: 2025-03-18

## 2024-03-18 RX ORDER — RIZATRIPTAN BENZOATE 10 MG/1
TABLET ORAL
Qty: 30 TABLET | Refills: 3 | Status: SHIPPED | OUTPATIENT
Start: 2024-03-18

## 2024-03-18 RX ORDER — AMLODIPINE BESYLATE 5 MG/1
5 TABLET ORAL DAILY
Qty: 90 TABLET | Refills: 1 | Status: SHIPPED | OUTPATIENT
Start: 2024-03-18 | End: 2024-06-27

## 2024-03-18 RX ORDER — LISDEXAMFETAMINE DIMESYLATE 20 MG/1
20 TABLET, CHEWABLE ORAL DAILY
Qty: 30 TABLET | Refills: 0 | Status: SHIPPED | OUTPATIENT
Start: 2024-03-18 | End: 2024-03-25

## 2024-03-18 ASSESSMENT — ENCOUNTER SYMPTOMS
ACTIVITY CHANGE: 0
HYPERACTIVE: 0
SLEEP DISTURBANCE: 0
VOICE CHANGE: 0
CONSTIPATION: 0
TROUBLE SWALLOWING: 0
TREMORS: 0
APPETITE CHANGE: 0
DECREASED CONCENTRATION: 1
BRUISES/BLEEDS EASILY: 0
ROS SKIN COMMENTS: DENIES NEW OR CHANGING MOLES
PALPITATIONS: 0
ABDOMINAL PAIN: 0
NERVOUS/ANXIOUS: 1
FATIGUE: 0
POLYDIPSIA: 0
ADENOPATHY: 0
UNEXPECTED WEIGHT CHANGE: 0
POLYPHAGIA: 0
ROS GI COMMENTS: DENIES CHANGE IN BOWEL HABITS, MELENA, HEMATOCHEZIA, BLOATING, OR EARLY SATIETY
DIARRHEA: 0

## 2024-03-18 ASSESSMENT — PATIENT HEALTH QUESTIONNAIRE - PHQ9: SUM OF ALL RESPONSES TO PHQ9 QUESTIONS 1 & 2: 0

## 2024-03-18 NOTE — ASSESSMENT & PLAN NOTE
Well adult female  Recheck metabolic labs  Colonoscopy referral given  tdap given in office today     Counseled patient on lifestyle modifications including increased exercise, altering diet to incorporate more lean protein and fiber with de-emphasis on simple carbohydrates    Follow up in 1 year or PRN.

## 2024-03-18 NOTE — PATIENT INSTRUCTIONS
Patient Active Problem List   Diagnosis Code    Encounter for preventive care Z00.00    Migraine G43.909    Sleep apnea in adult G47.30    Essential hypertension I10    Tinea pedis of right foot B35.3    Seasonal allergies J30.2    Elevated testosterone level R79.89    Elevated TSH R79.89    Gastroesophageal reflux disease K21.9    Carpal tunnel syndrome G56.00    Depressive disorder F32.A    Attention deficit hyperactivity disorder, predominantly inattentive type F90.0    Fatigue due to sleep pattern disturbance R53.83, G47.9    Daily consumption of alcohol Z78.9    Neck pain M54.2    Abnormal uterine bleeding N93.9    Migraine without aura, not intractable, without status migrainosus G43.009    Major depressive disorder, single episode, unspecified F32.9    Abnormal findings on diagnostic imaging of breast R92.8     You should ideally be fasting for 8+ hours prior to the labs so we can get a good look at your sugar and cholesterol.  Please do not restrict liquid.  Black coffee or water only until labs are drawn.

## 2024-03-18 NOTE — ASSESSMENT & PLAN NOTE
Chronic and controlled  Recheck metabolic labs  Referral for colonoscopy given        Anti-reflux measures such as raising the head of the bed, avoiding tight clothing or belts, avoiding eating late at night and not lying down shortly after mealtime and achieving weight loss are discussed. Avoid ASA, NSAID's, caffeine, peppermints, alcohol and tobacco. OTC H2 blockers and/or antacids are often very helpful for PRN use.

## 2024-03-18 NOTE — ASSESSMENT & PLAN NOTE
Chronic and uncontrolled  Start taking lisdexafetamine on as needed basis to avoid drug tolerance.   Urine drug screen   Follow up in 4 weeks

## 2024-03-18 NOTE — ASSESSMENT & PLAN NOTE
Keep up the good work!  No change in medications  Recheck metabolic labs   Call me if blood pressure at home is consistently greater than 140/80  Return in 6 months for next BP check

## 2024-03-18 NOTE — PROGRESS NOTES
Subjective      Patient ID: Cayla Burris is a 53 y.o. female here for the following:   Annual Exam      HPI    SUBJECTIVE:   53 y.o. female for annual routine checkup. Patient is struggling with ADD. She is having trouble completing her task. Current stress include  weekly travel,[place] unemployment and her own work related tasks. 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 Sleep is adequate. Uses mouth piece. No migraine headaches. Did not receive flu, COVID-19 booster.       Specific diet: Standard American diet  Caffeine intake: adequate  Exercise program: No formal routine   Sleep hours per night: Adequate    Dentist twice yearly: Yes  Optometrist or Opthalmologist annually?  Yes      Immunization History   Administered Date(s) Administered   • Influenza Vaccine Quadrivalent Preservative Free 6 Mons and Up 2021   • Influenza, Unspecified 2021   • SARS-COV-2 (COVID-19) VACCINE, MODERNA MONOVALENT 2021, 2022   • Tdap 2024   • Zoster Vaccine Recombinant Adjuvanted (Shingrix) 2021, 2022      Menstrual History:  OB History        3    Para   3    Term   3            AB        Living   3       SAB        IAB        Ectopic        Multiple        Live Births   3                No LMP recorded. Patient is perimenopausal.           PHQ 2 to 9:  Will the patient answer the depression questions?: Y    Little interest or pleasure in doing things: Not at all    Feeling down, depressed, or hopeless: Not at all    Depression Risk: 0           Chemistry        Component Value Date/Time     10/06/2023 1127    K 5.3 (H) 10/06/2023 112     10/06/2023 1127    CO2 26 10/06/2023 1127    BUN 19 10/06/2023 1127    CREATININE 0.63 10/06/2023 1127        Component Value Date/Time    ALKPHOS 106 10/06/2023 1127    AST 20 10/06/2023 1127    ALT 17 10/06/2023 1127    BILITOT 0.5 10/06/2023 112          Lab Results   Component  "Value Date    WBC 5.8 10/06/2023    HGB 14.9 10/06/2023    HCT 43.1 10/06/2023    MCV 91 10/06/2023     10/06/2023       Lab Results   Component Value Date    HGBA1C 4.6 (L) 01/03/2020     Lab Results   Component Value Date    CHOL 235 (H) 10/06/2023    CHOL 262 (H) 05/04/2021     Lab Results   Component Value Date    HDL 69 10/06/2023    HDL 68 05/04/2021     Lab Results   Component Value Date    LDLCALC 130 (H) 10/06/2023    LDLCALC 170 (H) 05/04/2021     Lab Results   Component Value Date    TRIG 207 (H) 10/06/2023    TRIG 137 05/04/2021     No results found for: \"CHOLHDL\"    The 10-year ASCVD risk score (Adriana CHAVEZ, et al., 2019) is: 5%    Values used to calculate the score:      Age: 53 years      Sex: Female      Is Non- : No      Diabetic: No      Tobacco smoker: Yes      Systolic Blood Pressure: 128 mmHg      Is BP treated: Yes      HDL Cholesterol: 69 mg/dL      Total Cholesterol: 235 mg/dL    The following have been reviewed and updated as appropriate in this visit:      Allergies  Meds  Problems  Social History      Patient Active Problem List   Diagnosis   • Encounter for preventive care   • Migraine   • Sleep apnea in adult   • Essential hypertension   • Tinea pedis of right foot   • Seasonal allergies   • Elevated testosterone level   • Elevated TSH   • Gastroesophageal reflux disease   • Carpal tunnel syndrome   • Depressive disorder   • Attention deficit hyperactivity disorder, predominantly inattentive type   • Fatigue due to sleep pattern disturbance   • Daily consumption of alcohol   • Neck pain   • Abnormal uterine bleeding   • Migraine without aura, not intractable, without status migrainosus   • Major depressive disorder, single episode, unspecified   • Abnormal findings on diagnostic imaging of breast    .    Social History     Tobacco Use   • Smoking status: Some Days     Packs/day: 0.30     Years: 10.00     Additional pack years: 0.00     Total pack years: " 3.00     Types: Cigarettes   • Smokeless tobacco: Never   Vaping Use   • Vaping Use: Never used   Substance Use Topics   • Alcohol use: Yes     Alcohol/week: 2.0 standard drinks of alcohol     Types: 2 Glasses of wine per week   • Drug use: No     Social History     Social History Narrative   • Not on file       Family History   Problem Relation Age of Onset   • No Known Problems Biological Father    • No Known Problems Biological Mother    • No Known Problems Biological Sister        Allergies as of 03/18/2024 - Reviewed 03/18/2024   Allergen Reaction Noted   • Penicillins  12/29/2015       Current Outpatient Medications   Medication Sig Dispense Refill   • amLODIPine (NORVASC) 5 mg tablet Take 1 tablet (5 mg total) by mouth daily. 90 tablet 1   • cyclobenzaprine (FLEXERIL) 5 mg tablet Take 1 tablet (5 mg total) by mouth daily as needed for muscle spasms. 30 tablet 1   • lisdexamfetamine 20 mg tablet,chewable Take 20 mg by mouth daily. 30 tablet 0   • loratadine (CLARITIN) 5 mg/5 mL syrup No SIG Entered     • rizatriptan (MAXALT) 10 mg tablet One tablet under tongue May be repeated in 2 hs. Wait 72 hours before repeating regimen You can take Excedrin migraine for 72 Hs, before repeating Rizatriptan again. 30 tablet 3   • traZODone (DESYREL) 50 mg tablet Take 1 tablet (50 mg total) by mouth nightly. 90 tablet 3     No current facility-administered medications for this visit.         Review of Systems   Constitutional: Negative for activity change, appetite change, fatigue and unexpected weight change.   HENT: Negative for trouble swallowing and voice change.    Cardiovascular: Negative for chest pain, palpitations and leg swelling.   Gastrointestinal: Negative for abdominal pain, constipation and diarrhea.        Denies Change in bowel habits, melena, hematochezia, bloating, or early satiety   Endocrine: Negative for cold intolerance, heat intolerance, polydipsia, polyphagia and polyuria.   Genitourinary:         "Denies changes in breasts including nipple discharge, skin change, or new lumps on self exam    Skin: Negative for rash.        Denies new or changing moles   Allergic/Immunologic: Negative for environmental allergies, food allergies and immunocompromised state.   Neurological: Negative for tremors.   Hematological: Negative for adenopathy. Does not bruise/bleed easily.   Psychiatric/Behavioral: Positive for decreased concentration. Negative for sleep disturbance. The patient is nervous/anxious. The patient is not hyperactive.        Objective   Vitals:   Vitals:    03/18/24 1537   BP: 128/76   BP Location: Left upper arm   Patient Position: Sitting   Pulse: 86   Temp: 36.2 °C (97.2 °F)   TempSrc: Temporal   SpO2: 99%   Weight: 65 kg (143 lb 3.2 oz)   Height: 1.613 m (5' 3.5\")     Body mass index is 24.97 kg/m².  No LMP recorded. Patient is perimenopausal.    No results found.    Physical Exam  Vitals and nursing note reviewed.   Constitutional:       General: She is not in acute distress.     Appearance: Normal appearance. She is not ill-appearing or diaphoretic.   HENT:      Head: Normocephalic and atraumatic.   Eyes:      General: No scleral icterus.        Right eye: No discharge.         Left eye: No discharge.      Extraocular Movements: Extraocular movements intact.      Conjunctiva/sclera: Conjunctivae normal.   Cardiovascular:      Rate and Rhythm: Normal rate and regular rhythm.      Pulses: Normal pulses.      Heart sounds: Normal heart sounds. No murmur heard.     No friction rub. No gallop.   Pulmonary:      Effort: Pulmonary effort is normal. No respiratory distress.      Breath sounds: Normal breath sounds. No wheezing or rales.   Abdominal:      General: Abdomen is flat. Bowel sounds are normal. There is no distension.      Palpations: Abdomen is soft. There is no mass.      Tenderness: There is no abdominal tenderness. There is no guarding or rebound.      Hernia: No hernia is present.   Skin:     " General: Skin is warm and dry.      Capillary Refill: Capillary refill takes less than 2 seconds.      Coloration: Skin is not jaundiced or pale.   Neurological:      General: No focal deficit present.      Mental Status: She is alert and oriented to person, place, and time.   Psychiatric:         Mood and Affect: Mood normal.         Behavior: Behavior normal.           ASSESSMENT & PLAN      Problem List Items Addressed This Visit        Nervous    Fatigue due to sleep pattern disturbance     Continue current regimen  Recheck metabolic labs.   Urine drug screen   Follow up in 4 weeks          Relevant Medications    traZODone (DESYREL) 50 mg tablet    lisdexamfetamine 20 mg tablet,chewable    Other Relevant Orders    TSH w reflex FT4    Comprehensive metabolic panel    CBC and Differential    Lipid panel    Drug screen panel, urine       Circulatory    Essential hypertension (Chronic)     Keep up the good work!  No change in medications  Recheck metabolic labs   Call me if blood pressure at home is consistently greater than 140/80  Return in 6 months for next BP check           Relevant Medications    amLODIPine (NORVASC) 5 mg tablet    Other Relevant Orders    TSH w reflex FT4    Comprehensive metabolic panel    CBC and Differential    Lipid panel       Digestive    Gastroesophageal reflux disease     Chronic and controlled  Recheck metabolic labs  Referral for colonoscopy given        Anti-reflux measures such as raising the head of the bed, avoiding tight clothing or belts, avoiding eating late at night and not lying down shortly after mealtime and achieving weight loss are discussed. Avoid ASA, NSAID's, caffeine, peppermints, alcohol and tobacco. OTC H2 blockers and/or antacids are often very helpful for PRN use.         Relevant Orders    TSH w reflex FT4    Comprehensive metabolic panel    CBC and Differential    Lipid panel    Direct Access Colonoscopy [facility]       Genitourinary    Abnormal uterine  bleeding     Issue resolved.   Recheck cbc, testosterone and prolactin levels           Relevant Orders    CBC and Differential    Testosterone, total and free    Prolactin       Mental Health    Depressive disorder     Chronic and controlled  Continue current regimen  Check urine drug screen  Follow up in 4 weeks          Relevant Medications    traZODone (DESYREL) 50 mg tablet    lisdexamfetamine 20 mg tablet,chewable    Other Relevant Orders    Drug screen panel, urine    Attention deficit hyperactivity disorder, predominantly inattentive type     Chronic and uncontrolled  Start taking lisdexafetamine on as needed basis to avoid drug tolerance.   Urine drug screen   Follow up in 4 weeks         Relevant Medications    traZODone (DESYREL) 50 mg tablet    lisdexamfetamine 20 mg tablet,chewable    Other Relevant Orders    Drug screen panel, urine       Other    Elevated testosterone level (Chronic)     Recheck testosterone and prolactin levels  Recheck TSH levels  Follow up in 4 weeks          Relevant Orders    TSH w reflex FT4    Testosterone, total and free    Prolactin    Elevated TSH (Chronic)     Recheck prolactin levels  Recheck TSH levels  Follow up in 4 weeks          Relevant Orders    TSH w reflex FT4    Prolactin    Encounter for preventive care - Primary     Well adult female  Recheck metabolic labs  Colonoscopy referral given  tdap given in office today     Counseled patient on lifestyle modifications including increased exercise, altering diet to incorporate more lean protein and fiber with de-emphasis on simple carbohydrates    Follow up in 1 year or PRN.          Relevant Orders    TSH w reflex FT4    Comprehensive metabolic panel    CBC and Differential    Lipid panel    Direct Access Colonoscopy [facility] [place]    Tdap vaccine greater than or equal to 8yo IM (Completed)    Migraine without aura, not intractable, without status migrainosus     Chronic and controlled           Relevant Medications     amLODIPine (NORVASC) 5 mg tablet    traZODone (DESYREL) 50 mg tablet    rizatriptan (MAXALT) 10 mg tablet         By signing my name below, I, Kadie Canada, attest that this documentation has been prepared under the direction and in the presence of Bhavya Melchor DO      3/18/2024 4:40 PM  Kadie Canada     This note was completed in part utilizing a voice recognition software and in part with a scribe. Grammatical errors, random word insertion, spelling mistakes, and incomplete sentences may be an occasional consequence of the system secondary to software limitations, ambient noise and hardware issues. Please read the chart carefully and recognize, using context, where substitutions have occurred. If you have any questions or concerns about the context, text or information contained within the body of this dictation, please contact me, the provider, for further clarification.      Bhavya Melchor D.O.  Family Medicine at [facility] 03/21/24

## 2024-06-26 DIAGNOSIS — I10 ESSENTIAL HYPERTENSION: Chronic | ICD-10-CM

## 2024-06-26 NOTE — TELEPHONE ENCOUNTER
Medicine Refill Request    Last Office Visit: 3/18/2024   Last Consult Visit: 5/12/2022  Last Telemedicine Visit: Visit date not found    Next Appointment: 10/14/2024      Current Outpatient Medications:     amLODIPine (NORVASC) 5 mg tablet, Take 1 tablet (5 mg total) by mouth daily., Disp: 90 tablet, Rfl: 1    cyclobenzaprine (FLEXERIL) 5 mg tablet, Take 1 tablet (5 mg total) by mouth daily as needed for muscle spasms., Disp: 30 tablet, Rfl: 1    lisdexamfetamine (VYVANSE) 20 mg capsule, Take 1 capsule (20 mg total) by mouth every morning., Disp: 30 capsule, Rfl: 0    loratadine (CLARITIN) 5 mg/5 mL syrup, No SIG Entered, Disp: , Rfl:     rizatriptan (MAXALT) 10 mg tablet, One tablet under tongue May be repeated in 2 hs. Wait 72 hours before repeating regimen You can take Excedrin migraine for 72 Hs, before repeating Rizatriptan again., Disp: 30 tablet, Rfl: 3    traZODone (DESYREL) 50 mg tablet, Take 1 tablet (50 mg total) by mouth nightly., Disp: 90 tablet, Rfl: 3      BP Readings from Last 3 Encounters:   03/18/24 128/76   11/01/23 120/70   10/18/23 (!) 172/92       Recent Lab results:  Lab Results   Component Value Date    CHOL 235 (H) 10/06/2023   ,   Lab Results   Component Value Date    HDL 69 10/06/2023   ,   Lab Results   Component Value Date    LDLCALC 130 (H) 10/06/2023   ,   Lab Results   Component Value Date    TRIG 207 (H) 10/06/2023        Lab Results   Component Value Date    GLUCOSE 100 (H) 10/06/2023   ,   Lab Results   Component Value Date    HGBA1C 4.6 (L) 01/03/2020         Lab Results   Component Value Date    CREATININE 0.63 10/06/2023       Lab Results   Component Value Date    TSH 2.780 10/06/2023           Lab Results   Component Value Date    HGBA1C 4.6 (L) 01/03/2020

## 2024-06-27 RX ORDER — AMLODIPINE BESYLATE 5 MG/1
5 TABLET ORAL DAILY
Qty: 90 TABLET | Refills: 1 | Status: SHIPPED | OUTPATIENT
Start: 2024-06-27

## 2024-07-15 DIAGNOSIS — E78.00 PURE HYPERCHOLESTEROLEMIA: Primary | ICD-10-CM

## 2024-07-15 RX ORDER — ATORVASTATIN CALCIUM 40 MG/1
40 TABLET, FILM COATED ORAL DAILY
Qty: 90 TABLET | Refills: 1 | Status: SHIPPED | OUTPATIENT
Start: 2024-07-15 | End: 2025-01-11

## 2024-07-22 LAB
ALBUMIN SERPL-MCNC: 4.4 G/DL (ref 3.8–4.9)
ALP SERPL-CCNC: 103 IU/L (ref 44–121)
ALT SERPL-CCNC: 12 IU/L (ref 0–32)
AMPHET+METHAMPHET UR-MCNC: NEGATIVE NG/ML
AST SERPL-CCNC: 20 IU/L (ref 0–40)
BARBITURATES UR QL SCN: NEGATIVE NG/ML
BASOPHILS # BLD AUTO: 0 X10E3/UL (ref 0–0.2)
BASOPHILS NFR BLD AUTO: 1 %
BENZODIAZ UR QL: NEGATIVE NG/ML
BILIRUB SERPL-MCNC: 0.8 MG/DL (ref 0–1.2)
BUN SERPL-MCNC: 15 MG/DL (ref 6–24)
BUN/CREAT SERPL: 19 (ref 9–23)
BZE UR QL: NEGATIVE NG/ML
CALCIUM SERPL-MCNC: 10.2 MG/DL (ref 8.7–10.2)
CANNABINOIDS UR QL SCN: NEGATIVE NG/ML
CHLORIDE SERPL-SCNC: 100 MMOL/L (ref 96–106)
CHOLEST SERPL-MCNC: 281 MG/DL (ref 100–199)
CO2 SERPL-SCNC: 23 MMOL/L (ref 20–29)
CREAT SERPL-MCNC: 0.77 MG/DL (ref 0.57–1)
EGFRCR SERPLBLD CKD-EPI 2021: 93 ML/MIN/1.73
EOSINOPHIL # BLD AUTO: 0.2 X10E3/UL (ref 0–0.4)
EOSINOPHIL NFR BLD AUTO: 4 %
ERYTHROCYTE [DISTWIDTH] IN BLOOD BY AUTOMATED COUNT: 12.4 % (ref 11.7–15.4)
GLOBULIN SER CALC-MCNC: 2.8 G/DL (ref 1.5–4.5)
GLUCOSE SERPL-MCNC: 94 MG/DL (ref 70–99)
HCT VFR BLD AUTO: 45.6 % (ref 34–46.6)
HDLC SERPL-MCNC: 69 MG/DL
HGB BLD-MCNC: 15 G/DL (ref 11.1–15.9)
IMM GRANULOCYTES # BLD AUTO: 0 X10E3/UL (ref 0–0.1)
IMM GRANULOCYTES NFR BLD AUTO: 0 %
LDLC SERPL CALC-MCNC: 188 MG/DL (ref 0–99)
LYMPHOCYTES # BLD AUTO: 2.1 X10E3/UL (ref 0.7–3.1)
LYMPHOCYTES NFR BLD AUTO: 37 %
MCH RBC QN AUTO: 31.1 PG (ref 26.6–33)
MCHC RBC AUTO-ENTMCNC: 32.9 G/DL (ref 31.5–35.7)
MCV RBC AUTO: 94 FL (ref 79–97)
MONOCYTES # BLD AUTO: 0.4 X10E3/UL (ref 0.1–0.9)
MONOCYTES NFR BLD AUTO: 7 %
NEUTROPHILS # BLD AUTO: 3 X10E3/UL (ref 1.4–7)
NEUTROPHILS NFR BLD AUTO: 51 %
OPIATES UR QL: NEGATIVE NG/ML
PCP UR QL SCN: NEGATIVE NG/ML
PLATELET # BLD AUTO: 285 X10E3/UL (ref 150–450)
POTASSIUM SERPL-SCNC: 4.7 MMOL/L (ref 3.5–5.2)
PROLACTIN SERPL-MCNC: 17.8 NG/ML (ref 3.6–25.2)
PROT SERPL-MCNC: 7.2 G/DL (ref 6–8.5)
RBC # BLD AUTO: 4.83 X10E6/UL (ref 3.77–5.28)
SODIUM SERPL-SCNC: 139 MMOL/L (ref 134–144)
T4 FREE SERPL-MCNC: 1.05 NG/DL (ref 0.82–1.77)
TESTOST FREE SERPL-MCNC: 3 PG/ML (ref 0–4.2)
TESTOST SERPL-MCNC: 23 NG/DL (ref 4–50)
TRIGL SERPL-MCNC: 133 MG/DL (ref 0–149)
TSH SERPL DL<=0.005 MIU/L-ACNC: 3.35 UIU/ML (ref 0.45–4.5)
VLDLC SERPL CALC-MCNC: 24 MG/DL (ref 5–40)
WBC # BLD AUTO: 5.8 X10E3/UL (ref 3.4–10.8)

## 2024-07-23 ENCOUNTER — TELEPHONE (OUTPATIENT)
Dept: FAMILY MEDICINE | Facility: CLINIC | Age: 54
End: 2024-07-23
Payer: COMMERCIAL

## 2024-07-24 NOTE — TELEPHONE ENCOUNTER
"Please call to let patient know labs/results are available on portal + new RX at pharmacy        Cholesterol Is quire elevated. Based on these numbers I recommend we start a statin (Lipitor) and recheck your labs right before our next visit in September. Dr Nelson   Written by Bhavya Melchor DO on 7/15/2024  2:52 AM EDT    Lab Results   Component Value Date    CHOL 281 (H) 07/11/2024     Lab Results   Component Value Date    HDL 69 07/11/2024     Lab Results   Component Value Date    LDLCALC 188 (H) 07/11/2024     Lab Results   Component Value Date    TRIG 133 07/11/2024       No results found for: \"CHOLHDL\"    "

## 2024-07-24 NOTE — TELEPHONE ENCOUNTER
Spoke to patient is comfortable starting the lipitor medication and will get the labs done before your next visit.

## 2024-08-08 DIAGNOSIS — F90.0 ATTENTION DEFICIT HYPERACTIVITY DISORDER, PREDOMINANTLY INATTENTIVE TYPE: ICD-10-CM

## 2024-08-09 RX ORDER — LISDEXAMFETAMINE DIMESYLATE 20 MG/1
20 CAPSULE ORAL EVERY MORNING
Qty: 30 CAPSULE | Refills: 0 | Status: SHIPPED | OUTPATIENT
Start: 2024-08-09 | End: 2024-08-20 | Stop reason: SDUPTHER

## 2024-08-13 DIAGNOSIS — F90.0 ATTENTION DEFICIT HYPERACTIVITY DISORDER, PREDOMINANTLY INATTENTIVE TYPE: ICD-10-CM

## 2024-08-13 RX ORDER — LISDEXAMFETAMINE DIMESYLATE 20 MG/1
20 CAPSULE ORAL EVERY MORNING
Qty: 30 CAPSULE | Refills: 0 | OUTPATIENT
Start: 2024-08-13 | End: 2024-09-12

## 2024-08-16 ENCOUNTER — PATIENT MESSAGE (OUTPATIENT)
Dept: FAMILY MEDICINE | Facility: CLINIC | Age: 54
End: 2024-08-16
Payer: COMMERCIAL

## 2024-08-16 ENCOUNTER — TELEPHONE (OUTPATIENT)
Dept: FAMILY MEDICINE | Facility: CLINIC | Age: 54
End: 2024-08-16
Payer: COMMERCIAL

## 2024-08-16 DIAGNOSIS — F90.0 ATTENTION DEFICIT HYPERACTIVITY DISORDER, PREDOMINANTLY INATTENTIVE TYPE: ICD-10-CM

## 2024-08-16 DIAGNOSIS — F90.0 ATTENTION DEFICIT HYPERACTIVITY DISORDER, PREDOMINANTLY INATTENTIVE TYPE: Primary | ICD-10-CM

## 2024-08-16 RX ORDER — LISDEXAMFETAMINE DIMESYLATE 20 MG/1
20 CAPSULE ORAL EVERY MORNING
Qty: 30 CAPSULE | Refills: 0 | Status: SHIPPED | OUTPATIENT
Start: 2024-08-16 | End: 2024-09-14

## 2024-08-16 RX ORDER — LISDEXAMFETAMINE DIMESYLATE 20 MG/1
20 CAPSULE ORAL EVERY MORNING
Qty: 30 CAPSULE | Refills: 0 | OUTPATIENT
Start: 2024-08-16 | End: 2024-09-15

## 2024-08-16 RX ORDER — LISDEXAMFETAMINE DIMESYLATE 20 MG/1
20 CAPSULE ORAL EVERY MORNING
Qty: 30 CAPSULE | Refills: 0 | Status: SHIPPED | OUTPATIENT
Start: 2024-09-15 | End: 2024-10-14

## 2024-08-16 RX ORDER — LISDEXAMFETAMINE DIMESYLATE 20 MG/1
20 CAPSULE ORAL EVERY MORNING
Qty: 30 CAPSULE | Refills: 0 | Status: SHIPPED | OUTPATIENT
Start: 2024-10-15 | End: 2024-11-13

## 2024-08-16 NOTE — TELEPHONE ENCOUNTER
Resent     Diagnoses and all orders for this visit:    Attention deficit hyperactivity disorder, predominantly inattentive type (Primary)  -     lisdexamfetamine (VYVANSE) 20 mg capsule; Take 1 capsule (20 mg total) by mouth every morning.  -     lisdexamfetamine (VYVANSE) 20 mg capsule; Take 1 capsule (20 mg total) by mouth every morning.  -     lisdexamfetamine (VYVANSE) 20 mg capsule; Take 1 capsule (20 mg total) by mouth every morning.

## 2024-08-16 NOTE — TELEPHONE ENCOUNTER
lisdexamfetamine (VYVANSE) 20 mg capsule        Sig: Take 1 capsule (20 mg total) by mouth every morning.        Class: Print        Earliest Fill Date: 8/9/2024        Route: oral            Patients script wasn't sent over electronically, Please advise.

## 2024-08-20 RX ORDER — LISDEXAMFETAMINE DIMESYLATE 20 MG/1
20 CAPSULE ORAL EVERY MORNING
Qty: 30 CAPSULE | Refills: 0 | Status: SHIPPED | OUTPATIENT
Start: 2024-08-20 | End: 2024-11-19 | Stop reason: SDUPTHER

## 2024-08-20 NOTE — TELEPHONE ENCOUNTER
Can you change the future refills you put in to go to [facility], it looks like 9/15/24 and 10/15/24 are for local not mail away

## 2024-08-20 NOTE — TELEPHONE ENCOUNTER
Ben Laird is doing well on Wegovy and he is exercising quite a lot. Ben Laird has lost 6 lbs since the last visit -- great job!   For now, continue Wegovy 0.5 mg WEEKLY, but we can increase to 1 mg if it's in stock -- in a few months we can consider increasing to 1.7 mg dose based on weight loss and availabiligy  He is taking anastrozole daily but we aren't sure how much he is really growing -- due for updated bone age x-ray at your convenience  Follow up in three months Spoke with patient and advised medication was sent to local [facility] in [place] today and that a refill request would be put in again to provider to change future refills to go to [facility] not local.

## 2024-08-20 NOTE — TELEPHONE ENCOUNTER
Can you send this order to [facility] at [facility] and [address] [place]?  I called and they have it in stock.       It is on back order at [facility] and they said not a [facility] within 100 miles has it.  They said the orders were sent back to your office.  Obviously, a bigger issue with their .  I noticed that you also prescribed refills for September and October.  Perhaps we can try putting those through [facility] mail order.  I believe that info. is on my insurance card but let me know if you need anything from me.     Thanks again!!  Cayla   [phone]

## 2024-11-19 DIAGNOSIS — F90.0 ATTENTION DEFICIT HYPERACTIVITY DISORDER, PREDOMINANTLY INATTENTIVE TYPE: ICD-10-CM

## 2024-11-19 NOTE — TELEPHONE ENCOUNTER
Medicine Refill Request    Last Office Visit: 3/18/2024   Last Consult Visit: 5/12/2022  Last Telemedicine Visit: Visit date not found    Next Appointment: 4/1/2025      Current Outpatient Medications:     amLODIPine (NORVASC) 5 mg tablet, TAKE 1 TABLET DAILY, Disp: 90 tablet, Rfl: 1    atorvastatin (LIPITOR) 40 mg tablet, Take 1 tablet (40 mg total) by mouth daily., Disp: 90 tablet, Rfl: 1    cyclobenzaprine (FLEXERIL) 5 mg tablet, Take 1 tablet (5 mg total) by mouth daily as needed for muscle spasms., Disp: 30 tablet, Rfl: 1    lisdexamfetamine (VYVANSE) 20 mg capsule, Take 1 capsule (20 mg total) by mouth every morning., Disp: 30 capsule, Rfl: 0    lisdexamfetamine (VYVANSE) 20 mg capsule, Take 1 capsule (20 mg total) by mouth every morning., Disp: 30 capsule, Rfl: 0    lisdexamfetamine (VYVANSE) 20 mg capsule, Take 1 capsule (20 mg total) by mouth every morning., Disp: 30 capsule, Rfl: 0    lisdexamfetamine (VYVANSE) 20 mg capsule, Take 1 capsule (20 mg total) by mouth every morning., Disp: 30 capsule, Rfl: 0    loratadine (CLARITIN) 5 mg/5 mL syrup, No SIG Entered, Disp: , Rfl:     rizatriptan (MAXALT) 10 mg tablet, One tablet under tongue May be repeated in 2 hs. Wait 72 hours before repeating regimen You can take Excedrin migraine for 72 Hs, before repeating Rizatriptan again., Disp: 30 tablet, Rfl: 3    traZODone (DESYREL) 50 mg tablet, Take 1 tablet (50 mg total) by mouth nightly., Disp: 90 tablet, Rfl: 3      BP Readings from Last 3 Encounters:   03/18/24 128/76   11/01/23 120/70   10/18/23 (!) 172/92       Recent Lab results:  Lab Results   Component Value Date    CHOL 281 (H) 07/11/2024   ,   Lab Results   Component Value Date    HDL 69 07/11/2024   ,   Lab Results   Component Value Date    LDLCALC 188 (H) 07/11/2024   ,   Lab Results   Component Value Date    TRIG 133 07/11/2024        Lab Results   Component Value Date    GLUCOSE 94 07/11/2024   ,   Lab Results   Component Value Date    HGBA1C 4.6 (L)  01/03/2020         Lab Results   Component Value Date    CREATININE 0.77 07/11/2024       Lab Results   Component Value Date    TSH 3.350 07/11/2024           Lab Results   Component Value Date    HGBA1C 4.6 (L) 01/03/2020

## 2024-11-20 RX ORDER — LISDEXAMFETAMINE DIMESYLATE 20 MG/1
20 CAPSULE ORAL EVERY MORNING
Qty: 30 CAPSULE | Refills: 0 | Status: SHIPPED | OUTPATIENT
Start: 2024-11-20 | End: 2024-12-20

## 2025-02-03 ENCOUNTER — DOCUMENTATION (OUTPATIENT)
Dept: CASE MANAGEMENT | Facility: CLINIC | Age: 55
End: 2025-02-03
Payer: COMMERCIAL

## 2025-02-03 NOTE — PROGRESS NOTES
Cesilia Whitaker MA has completed a chart review for Cayla Burris and have determined that the care gap has been satisfied.    Care Gap Source: [facility]    Care Gap(s) Identified: Colorectal Cancer Screening    Chart Review Completed: Yes      Edited appointment note. Patient is due for colonoscopy.

## 2025-04-01 ENCOUNTER — OFFICE VISIT (OUTPATIENT)
Dept: FAMILY MEDICINE | Facility: CLINIC | Age: 55
End: 2025-04-01
Payer: COMMERCIAL

## 2025-04-01 VITALS
HEIGHT: 63 IN | TEMPERATURE: 97.7 F | HEART RATE: 89 BPM | DIASTOLIC BLOOD PRESSURE: 86 MMHG | BODY MASS INDEX: 24.27 KG/M2 | RESPIRATION RATE: 16 BRPM | WEIGHT: 137 LBS | SYSTOLIC BLOOD PRESSURE: 146 MMHG | OXYGEN SATURATION: 99 %

## 2025-04-01 DIAGNOSIS — R79.89 ELEVATED TESTOSTERONE LEVEL: Chronic | ICD-10-CM

## 2025-04-01 DIAGNOSIS — K21.9 GASTROESOPHAGEAL REFLUX DISEASE, UNSPECIFIED WHETHER ESOPHAGITIS PRESENT: ICD-10-CM

## 2025-04-01 DIAGNOSIS — R79.89 ELEVATED TSH: Chronic | ICD-10-CM

## 2025-04-01 DIAGNOSIS — L30.1 DYSHIDROTIC ECZEMA: ICD-10-CM

## 2025-04-01 DIAGNOSIS — I10 ESSENTIAL HYPERTENSION: ICD-10-CM

## 2025-04-01 DIAGNOSIS — B35.3 ATHLETE'S FOOT ON RIGHT: ICD-10-CM

## 2025-04-01 DIAGNOSIS — G47.30 SLEEP APNEA IN ADULT: ICD-10-CM

## 2025-04-01 DIAGNOSIS — Z12.11 SCREENING FOR COLON CANCER: ICD-10-CM

## 2025-04-01 DIAGNOSIS — Z00.00 ENCOUNTER FOR PREVENTIVE CARE: ICD-10-CM

## 2025-04-01 DIAGNOSIS — R09.81 NASAL CONGESTION DUE TO PROLONGED USE OF DECONGESTANTS: ICD-10-CM

## 2025-04-01 DIAGNOSIS — T48.5X5A NASAL CONGESTION DUE TO PROLONGED USE OF DECONGESTANTS: ICD-10-CM

## 2025-04-01 DIAGNOSIS — F90.0 ATTENTION DEFICIT HYPERACTIVITY DISORDER, PREDOMINANTLY INATTENTIVE TYPE: Primary | ICD-10-CM

## 2025-04-01 DIAGNOSIS — Z12.31 BREAST CANCER SCREENING BY MAMMOGRAM: ICD-10-CM

## 2025-04-01 PROBLEM — F32.9 MAJOR DEPRESSIVE DISORDER, SINGLE EPISODE, UNSPECIFIED: Status: RESOLVED | Noted: 2021-11-09 | Resolved: 2025-04-01

## 2025-04-01 PROBLEM — Z78.9 DAILY CONSUMPTION OF ALCOHOL: Status: RESOLVED | Noted: 2021-11-09 | Resolved: 2025-04-01

## 2025-04-01 PROBLEM — N93.9 ABNORMAL UTERINE BLEEDING: Status: RESOLVED | Noted: 2024-03-18 | Resolved: 2025-04-01

## 2025-04-01 PROCEDURE — 3008F BODY MASS INDEX DOCD: CPT | Performed by: STUDENT IN AN ORGANIZED HEALTH CARE EDUCATION/TRAINING PROGRAM

## 2025-04-01 PROCEDURE — 99214 OFFICE O/P EST MOD 30 MIN: CPT | Mod: 25 | Performed by: STUDENT IN AN ORGANIZED HEALTH CARE EDUCATION/TRAINING PROGRAM

## 2025-04-01 PROCEDURE — 99396 PREV VISIT EST AGE 40-64: CPT | Performed by: STUDENT IN AN ORGANIZED HEALTH CARE EDUCATION/TRAINING PROGRAM

## 2025-04-01 RX ORDER — AMLODIPINE BESYLATE 5 MG/1
5 TABLET ORAL DAILY
Qty: 90 TABLET | Refills: 1 | Status: SHIPPED | OUTPATIENT
Start: 2025-04-01

## 2025-04-01 RX ORDER — CLOTRIMAZOLE AND BETAMETHASONE DIPROPIONATE 10; .64 MG/G; MG/G
CREAM TOPICAL 2 TIMES DAILY
Qty: 45 G | Refills: 1 | Status: SHIPPED | OUTPATIENT
Start: 2025-04-01 | End: 2025-05-01

## 2025-04-01 RX ORDER — CETIRIZINE HYDROCHLORIDE 10 MG/1
TABLET ORAL
Qty: 104 TABLET | Refills: 0 | Status: SHIPPED | OUTPATIENT
Start: 2025-04-01 | End: 2025-07-07

## 2025-04-01 RX ORDER — CLOTRIMAZOLE AND BETAMETHASONE DIPROPIONATE 10; .64 MG/G; MG/G
CREAM TOPICAL 2 TIMES DAILY
Qty: 45 G | Refills: 1 | Status: SHIPPED | OUTPATIENT
Start: 2025-04-01 | End: 2025-04-01 | Stop reason: SDUPTHER

## 2025-04-01 RX ORDER — FLUTICASONE PROPIONATE 50 MCG
SPRAY, SUSPENSION (ML) NASAL
Qty: 16 G | Refills: 5 | Status: SHIPPED | OUTPATIENT
Start: 2025-04-01 | End: 2025-10-05

## 2025-04-01 RX ORDER — PREDNISONE 10 MG/1
TABLET ORAL
Qty: 30 TABLET | Refills: 0 | Status: SHIPPED | OUTPATIENT
Start: 2025-04-01

## 2025-04-01 ASSESSMENT — ENCOUNTER SYMPTOMS
FATIGUE: 0
ROS SKIN COMMENTS: DENIES NEW OR CHANGING MOLES
HYPERACTIVE: 0
TROUBLE SWALLOWING: 0
TREMORS: 0
ROS GI COMMENTS: DENIES CHANGE IN BOWEL HABITS, MELENA, HEMATOCHEZIA, BLOATING, OR EARLY SATIETY
PALPITATIONS: 0
UNEXPECTED WEIGHT CHANGE: 0
CONSTIPATION: 0
APPETITE CHANGE: 0
NERVOUS/ANXIOUS: 1
SLEEP DISTURBANCE: 0
VOICE CHANGE: 0
POLYPHAGIA: 0
ADENOPATHY: 0
DIARRHEA: 0
BRUISES/BLEEDS EASILY: 0
DECREASED CONCENTRATION: 1
ACTIVITY CHANGE: 0
POLYDIPSIA: 0
ABDOMINAL PAIN: 0

## 2025-04-01 ASSESSMENT — PATIENT HEALTH QUESTIONNAIRE - PHQ9: SUM OF ALL RESPONSES TO PHQ9 QUESTIONS 1 & 2: 0

## 2025-04-01 NOTE — PROGRESS NOTES
Subjective      Patient ID: Cayla Burris is a 54 y.o. female here for the following:   Follow-up      HPI    SUBJECTIVE:   54 y.o. female for annual routine checkup. Patient is struggling with ADD. She is having trouble completing her task. Current stress include  weekly travel,son unemployment and her own work related tasks. 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 Sleep is adequate. Uses mouth piece. No migraine headaches. Did not receive flu, COVID-19 booster.       Specific diet: Standard American diet  Caffeine intake: adequate  Exercise program: No formal routine   Sleep hours per night: Adequate    Dentist twice yearly: Yes  Optometrist or Opthalmologist annually?  Yes    History of Present Illness  The patient presents for evaluation of weight management, migraines, carpal tunnel syndrome, eczema, blood pressure management, and sinus issues.    She has been struggling with a persistent weight gain of over 20 pounds for several years, which she attributes to her pregnancies. She is seeking additional support in her weight loss journey. Her current weight goal is 124 pounds, a weight she previously maintained comfortably. She reports an improvement in her energy levels, which she attributes to increased physical activity, including walking, biking, and using a rowing machine approximately 4 days per week. She has not yet completed the blood work ordered on 03/27/2025 but plans to do so.    She reports no recent headaches or migraines. She finds it challenging to differentiate between a migraine and a regular headache until she takes Advil, which is ineffective for her migraines. She does not experience any aura symptoms or scotomas. Her sleep quality is generally good, particularly when she is exercising, and she does not experience morning headaches. She has previously used Maxalt (rizatriptan) for migraine management, which was effective after a single  dose.    She experiences wrist pain, which she manages with stretches taught by her daughter, an occupational therapy student. The wrist pain does not disrupt her sleep, and she reports no instances of dropping objects or a decrease in hand strength.    She has been dealing with eczema on her foot, which she initially mistook for athlete's foot. The condition causes itching, which she manages with cortisone applied 3 times daily. If she misses a dose, the eczema flares up. She also reports a fungal infection on her foot. She plans to schedule an appointment with Dr. Shady Lopez. She recalls an incident a year ago when she wore wedge heels and spent a significant amount of time on her feet, resulting in a black and blue toe the following day. A podiatrist diagnosed her with bone-on-bone arthritis in her toe, but she is seeking a second opinion from Dr. Shady Lopez.    She has been taking Claritin-D daily for most of her life and is requesting a prescription for it. She experiences head congestion that drips into her throat, even during sleep, and snores due to mucus. She always feels congested. She has Aller-Tomasz nasal spray at home and has previously used Zyrtec and Flonase. She had a polyp in her 20s but is not currently seeing an ear, nose, and throat doctor.    She does not monitor her blood pressure at home and has not taken amlodipine today. Her last dose was approximately 2 weeks ago. She has discontinued Vyvanse and only takes it when she needs to focus.    She has not seen her OB/GYN recently and needs to schedule an appointment. Her last Pap smear was in 2021. She reports no abnormal bleeding or spotting, discomfort during intercourse, or issues with bowel or bladder incontinence, even during exercise. She reports no changes in bowel habits and describes them as regular. Her last dental visit was 2 months ago. It has been several years since her last dermatology appointment for a full skin check.    FAMILY  HISTORY  Her mother was very recently diagnosed with osteoporosis at the age of 76 and has glaucoma. Her father was diagnosed with atrial fibrillation at the age of 78.    MEDICATIONS  Current: Amlodipine, Claritin-D, Vyvanse (as needed)  Past: Maxalt (rizatriptan)    Wt Readings from Last 3 Encounters:   25 62.1 kg (137 lb)   24 65 kg (143 lb 3.2 oz)   23 65.2 kg (143 lb 12.8 oz)     Immunization History   Administered Date(s) Administered    Influenza Vaccine Quadrivalent Preservative Free 6 Mons and Up 2021    Influenza, Unspecified 2021    SARS-COV-2 (COVID-19) VACCINE, MODERNA MONOVALENT 2021, 2022    Tdap 2024    Zoster Vaccine Recombinant Adjuvanted (Shingrix) 2021, 2022      Menstrual History:  OB History          3    Para   3    Term   3            AB        Living   3         SAB        IAB        Ectopic        Multiple        Live Births   3                No LMP recorded. Patient is perimenopausal.           PHQ 2 to 9:  Will the patient answer the depression questions?: Y    Little interest or pleasure in doing things: Not at all    Feeling down, depressed, or hopeless: Not at all    Depression Risk: 0           Chemistry        Component Value Date/Time     2024 1054    K 4.7 2024 1054     2024 1054    CO2 23 2024 1054    BUN 15 2024 1054    CREATININE 0.77 2024 1054        Component Value Date/Time    ALKPHOS 103 2024 1054    AST 20 2024 1054    ALT 12 2024 1054    BILITOT 0.8 2024 1054          Lab Results   Component Value Date    WBC 5.8 2024    HGB 15.0 2024    HCT 45.6 2024    MCV 94 2024     2024       Lab Results   Component Value Date    HGBA1C 4.6 (L) 2020     Lab Results   Component Value Date    CHOL 281 (H) 2024    CHOL 235 (H) 10/06/2023    CHOL 262 (H) 2021     Lab Results   Component  "Value Date    HDL 69 07/11/2024    HDL 69 10/06/2023    HDL 68 05/04/2021     Lab Results   Component Value Date    LDLCALC 188 (H) 07/11/2024    LDLCALC 130 (H) 10/06/2023    LDLCALC 170 (H) 05/04/2021     Lab Results   Component Value Date    TRIG 133 07/11/2024    TRIG 207 (H) 10/06/2023    TRIG 137 05/04/2021     No results found for: \"CHOLHDL\"    The 10-year ASCVD risk score (Adriana CHAVEZ, et al., 2019) is: 8.5%    Values used to calculate the score:      Age: 54 years      Sex: Female      Is Non- : No      Diabetic: No      Tobacco smoker: Yes      Systolic Blood Pressure: 146 mmHg      Is BP treated: Yes      HDL Cholesterol: 69 mg/dL      Total Cholesterol: 281 mg/dL    The following have been reviewed and updated as appropriate in this visit:      Allergies  Meds  Problems  Social History      Patient Active Problem List   Diagnosis    Encounter for preventive care    Sleep apnea in adult    Essential hypertension    Seasonal allergies    Elevated testosterone level    Elevated TSH    Gastroesophageal reflux disease    Carpal tunnel syndrome    Depressive disorder    Attention deficit hyperactivity disorder, predominantly inattentive type    Fatigue due to sleep pattern disturbance    Neck pain    Migraine without aura, not intractable, without status migrainosus    Abnormal findings on diagnostic imaging of breast    .    Social History     Tobacco Use    Smoking status: Some Days     Current packs/day: 0.30     Average packs/day: 0.3 packs/day for 10.0 years (3.0 ttl pk-yrs)     Types: Cigarettes    Smokeless tobacco: Never   Vaping Use    Vaping status: Never Used   Substance Use Topics    Alcohol use: Yes     Alcohol/week: 2.0 standard drinks of alcohol     Types: 2 Glasses of wine per week    Drug use: No     Social History     Social History Narrative    Not on file       Family History   Problem Relation Name Age of Onset    Osteoporosis Biological Mother      Glaucoma " Biological Mother      Atrial fibrillation Biological Father      No Known Problems Biological Sister         Allergies as of 04/01/2025 - Reviewed 04/01/2025   Allergen Reaction Noted    Penicillins  12/29/2015       Current Outpatient Medications   Medication Sig Dispense Refill    amLODIPine (NORVASC) 5 mg tablet Take 1 tablet (5 mg total) by mouth daily. 90 tablet 1    cetirizine (ZyrTEC) 10 mg tablet Take 1 tablet (10 mg total) by mouth 2 (two) times a day for 7 days, THEN 1 tablet (10 mg total) daily. 104 tablet 0    clotrimazole-betamethasone (LOTRISONE) 1-0.05 % cream Apply topically 2 (two) times a day. 45 g 1    cyclobenzaprine (FLEXERIL) 5 mg tablet Take 1 tablet (5 mg total) by mouth daily as needed for muscle spasms. 30 tablet 1    fluticasone propionate (FLONASE) 50 mcg/actuation nasal spray Administer 2 sprays into each nostril daily for 7 days, THEN 1 spray daily. 16 g 5    predniSONE (DELTASONE) 10 mg tablet Take 40 mg (4 tablets) daily for 3 days, take 30 mg (3 tablets) daily for 3 days, take 20 mg (2 tablets) daily for 3 days followed by 10 mg (1 tablet) daily for 3 days until complete. 30 tablet 0    rizatriptan (MAXALT) 10 mg tablet One tablet under tongue May be repeated in 2 hs. Wait 72 hours before repeating regimen You can take Excedrin migraine for 72 Hs, before repeating Rizatriptan again. 30 tablet 3    traZODone (DESYREL) 50 mg tablet Take 1 tablet (50 mg total) by mouth nightly. 90 tablet 3     No current facility-administered medications for this visit.         Review of Systems   Constitutional:  Negative for activity change, appetite change, fatigue and unexpected weight change.   HENT:  Negative for trouble swallowing and voice change.    Cardiovascular:  Negative for chest pain, palpitations and leg swelling.   Gastrointestinal:  Negative for abdominal pain, constipation and diarrhea.        Denies Change in bowel habits, melena, hematochezia, bloating, or early satiety  "  Endocrine: Negative for cold intolerance, heat intolerance, polydipsia, polyphagia and polyuria.   Genitourinary:         Denies changes in breasts including nipple discharge, skin change, or new lumps on self exam    Skin:  Negative for rash.        Denies new or changing moles   Allergic/Immunologic: Negative for environmental allergies, food allergies and immunocompromised state.   Neurological:  Negative for tremors.   Hematological:  Negative for adenopathy. Does not bruise/bleed easily.   Psychiatric/Behavioral:  Positive for decreased concentration. Negative for sleep disturbance. The patient is nervous/anxious. The patient is not hyperactive.        Objective   Vitals:   Vitals:    04/01/25 1530 04/01/25 1606   BP: (!) 144/84 (!) 146/86   BP Location: Left upper arm    Patient Position: Sitting    Pulse: 89    Resp: 16    Temp: 36.5 °C (97.7 °F)    TempSrc: Skin    SpO2: 99%    Weight: 62.1 kg (137 lb)    Height: 1.588 m (5' 2.5\")      Body mass index is 24.66 kg/m².  No LMP recorded. Patient is perimenopausal.    No results found.    Physical Exam  Vitals and nursing note reviewed.   Constitutional:       General: She is not in acute distress.     Appearance: Normal appearance. She is well-developed. She is not ill-appearing, toxic-appearing or diaphoretic.   HENT:      Head: Normocephalic and atraumatic.      Right Ear: Tympanic membrane, ear canal and external ear normal. There is no impacted cerumen.      Left Ear: Tympanic membrane, ear canal and external ear normal. There is no impacted cerumen.      Nose: Rhinorrhea present.      Mouth/Throat:      Mouth: Mucous membranes are moist.      Pharynx: No oropharyngeal exudate or posterior oropharyngeal erythema.   Eyes:      General: No scleral icterus.        Right eye: No discharge.         Left eye: No discharge.      Conjunctiva/sclera: Conjunctivae normal.   Cardiovascular:      Rate and Rhythm: Normal rate and regular rhythm.      Heart sounds: " Normal heart sounds. No murmur heard.     No friction rub. No gallop.   Pulmonary:      Effort: Pulmonary effort is normal. No respiratory distress.      Breath sounds: Normal breath sounds. No stridor. No wheezing, rhonchi or rales.   Abdominal:      General: Bowel sounds are normal. There is no distension.      Palpations: Abdomen is soft.      Tenderness: There is no abdominal tenderness. There is no guarding or rebound.   Musculoskeletal:      Cervical back: No tenderness.      Right lower leg: No edema.      Left lower leg: No edema.   Lymphadenopathy:      Cervical: No cervical adenopathy.   Skin:     General: Skin is warm and dry.      Capillary Refill: Capillary refill takes less than 2 seconds.      Coloration: Skin is not pale.      Findings: No rash.   Neurological:      General: No focal deficit present.      Mental Status: She is alert and oriented to person, place, and time. Mental status is at baseline.      Cranial Nerves: No cranial nerve deficit.      Gait: Gait normal.      Comments: CN 2-12 grossly intact. Moves all extremities spontaneously    Psychiatric:         Attention and Perception: Attention and perception normal. She is attentive. She does not perceive auditory or visual hallucinations.         Mood and Affect: Mood and affect normal. Affect is not labile or flat.         Speech: Speech normal. She is communicative.         Behavior: Behavior normal. Behavior is cooperative.         Thought Content: Thought content normal.         Judgment: Judgment normal. Judgment is not impulsive or inappropriate.           ASSESSMENT & PLAN  Multiple issues that are currently uncontrolled given her chronic daily decongestant use and elevated pressure today without amlodipine I am concerned about needing to taper her off antihistamines    Problem List Items Addressed This Visit          Nervous    Sleep apnea in adult       Circulatory    Essential hypertension (Chronic)    Relevant Medications     amLODIPine (NORVASC) 5 mg tablet    Other Relevant Orders    TSH w reflex FT4    Comprehensive metabolic panel    CBC and Differential       Digestive    Gastroesophageal reflux disease    Relevant Orders    TSH w reflex FT4    Comprehensive metabolic panel    CBC and Differential       Mental Health    Attention deficit hyperactivity disorder, predominantly inattentive type - Primary    Relevant Medications    cetirizine (ZyrTEC) 10 mg tablet       Other    Elevated testosterone level (Chronic)    Relevant Orders    Testosterone, total and free    Elevated TSH (Chronic)    Relevant Orders    TSH w reflex FT4    Encounter for preventive care    Relevant Orders    TSH w reflex FT4    Comprehensive metabolic panel    CBC and Differential    Lipid panel     Other Visit Diagnoses         Breast cancer screening by mammogram        Relevant Orders    BI SCREENING MAMMOGRAM BILATERAL(TOMOSYNTHESIS)      Screening for colon cancer        Relevant Orders    Direct Access Colonoscopy [facility]      Athlete's foot on right        Relevant Medications    clotrimazole-betamethasone (LOTRISONE) 1-0.05 % cream    Other Relevant Orders    Ambulatory referral to Podiatry    Ambulatory referral to Dermatology      Dyshidrotic eczema        Relevant Medications    clotrimazole-betamethasone (LOTRISONE) 1-0.05 % cream    Other Relevant Orders    Ambulatory referral to Dermatology      Nasal congestion due to prolonged use of decongestants        Relevant Medications    fluticasone propionate (FLONASE) 50 mcg/actuation nasal spray    cetirizine (ZyrTEC) 10 mg tablet    predniSONE (DELTASONE) 10 mg tablet    Other Relevant Orders    Upper respiratory culture          This note was completed in part utilizing a voice recognition software and in part with a scribe. Grammatical errors, random word insertion, spelling mistakes, and incomplete sentences may be an occasional consequence of the system secondary to software limitations, ambient  noise and hardware issues. Please read the chart carefully and recognize, using context, where substitutions have occurred. If you have any questions or concerns about the context, text or information contained within the body of this dictation, please contact me, the provider, for further clarification.      Bhavya Melchor D.O.  Family Medicine at [facility] 04/01/25

## 2025-04-04 ENCOUNTER — RESULTS FOLLOW-UP (OUTPATIENT)
Dept: FAMILY MEDICINE | Facility: CLINIC | Age: 55
End: 2025-04-04
Payer: COMMERCIAL

## 2025-04-04 LAB
BACTERIA SPEC RESP CULT: NORMAL
BACTERIA SPEC RESP CULT: NORMAL

## 2025-06-24 DIAGNOSIS — I10 ESSENTIAL HYPERTENSION: ICD-10-CM

## 2025-06-24 DIAGNOSIS — R09.81 NASAL CONGESTION DUE TO PROLONGED USE OF DECONGESTANTS: ICD-10-CM

## 2025-06-24 DIAGNOSIS — T48.5X5A NASAL CONGESTION DUE TO PROLONGED USE OF DECONGESTANTS: ICD-10-CM

## 2025-06-24 RX ORDER — FLUTICASONE PROPIONATE 50 MCG
SPRAY, SUSPENSION (ML) NASAL
Qty: 16 G | Refills: 5 | Status: CANCELLED | OUTPATIENT
Start: 2025-06-24 | End: 2025-12-28

## 2025-06-24 RX ORDER — AMLODIPINE BESYLATE 5 MG/1
5 TABLET ORAL DAILY
Qty: 90 TABLET | Refills: 1 | Status: CANCELLED | OUTPATIENT
Start: 2025-06-24

## 2025-06-24 RX ORDER — CETIRIZINE HYDROCHLORIDE 10 MG/1
TABLET ORAL
Qty: 104 TABLET | Refills: 0 | Status: CANCELLED | OUTPATIENT
Start: 2025-06-24 | End: 2025-09-29

## 2025-07-08 ENCOUNTER — RESULTS FOLLOW-UP (OUTPATIENT)
Dept: FAMILY MEDICINE | Facility: CLINIC | Age: 55
End: 2025-07-08

## 2025-07-09 DIAGNOSIS — I10 ESSENTIAL HYPERTENSION: ICD-10-CM

## 2025-07-09 RX ORDER — AMLODIPINE BESYLATE 5 MG/1
5 TABLET ORAL DAILY
Qty: 90 TABLET | Refills: 1 | OUTPATIENT
Start: 2025-07-09

## 2025-07-10 DIAGNOSIS — T48.5X5A NASAL CONGESTION DUE TO PROLONGED USE OF DECONGESTANTS: ICD-10-CM

## 2025-07-10 DIAGNOSIS — I10 ESSENTIAL HYPERTENSION: ICD-10-CM

## 2025-07-10 DIAGNOSIS — G43.009 MIGRAINE WITHOUT AURA, NOT INTRACTABLE, WITHOUT STATUS MIGRAINOSUS: ICD-10-CM

## 2025-07-10 DIAGNOSIS — R53.83 FATIGUE DUE TO SLEEP PATTERN DISTURBANCE: ICD-10-CM

## 2025-07-10 DIAGNOSIS — F32.A DEPRESSIVE DISORDER: ICD-10-CM

## 2025-07-10 DIAGNOSIS — G47.9 FATIGUE DUE TO SLEEP PATTERN DISTURBANCE: ICD-10-CM

## 2025-07-10 DIAGNOSIS — R09.81 NASAL CONGESTION DUE TO PROLONGED USE OF DECONGESTANTS: ICD-10-CM

## 2025-07-10 RX ORDER — CYCLOBENZAPRINE HCL 5 MG
5 TABLET ORAL DAILY PRN
Qty: 30 TABLET | Refills: 1 | Status: SHIPPED | OUTPATIENT
Start: 2025-07-10

## 2025-07-10 RX ORDER — AMLODIPINE BESYLATE 5 MG/1
5 TABLET ORAL DAILY
Qty: 90 TABLET | Refills: 1 | Status: CANCELLED | OUTPATIENT
Start: 2025-07-10

## 2025-07-10 RX ORDER — FLUTICASONE PROPIONATE 50 MCG
SPRAY, SUSPENSION (ML) NASAL
Qty: 16 G | Refills: 5 | Status: SHIPPED | OUTPATIENT
Start: 2025-07-10 | End: 2026-01-13

## 2025-07-10 RX ORDER — CETIRIZINE HYDROCHLORIDE 10 MG/1
10 TABLET ORAL DAILY
Qty: 90 TABLET | Refills: 0 | Status: SHIPPED | OUTPATIENT
Start: 2025-07-10 | End: 2025-10-08

## 2025-07-10 NOTE — TELEPHONE ENCOUNTER
Medicine Refill Request    Last Office Visit: 4/1/2025   Last Consult Visit: 5/12/2022  Last Telemedicine Visit: Visit date not found    Next Appointment: 8/13/2025      Current Outpatient Medications:     amLODIPine (NORVASC) 5 mg tablet, Take 1 tablet (5 mg total) by mouth daily., Disp: 90 tablet, Rfl: 1    cetirizine (ZyrTEC) 10 mg tablet, Take 1 tablet (10 mg total) by mouth 2 (two) times a day for 7 days, THEN 1 tablet (10 mg total) daily., Disp: 104 tablet, Rfl: 0    cyclobenzaprine (FLEXERIL) 5 mg tablet, Take 1 tablet (5 mg total) by mouth daily as needed for muscle spasms., Disp: 30 tablet, Rfl: 1    fluticasone propionate (FLONASE) 50 mcg/actuation nasal spray, Administer 2 sprays into each nostril daily for 7 days, THEN 1 spray daily., Disp: 16 g, Rfl: 5    predniSONE (DELTASONE) 10 mg tablet, Take 40 mg (4 tablets) daily for 3 days, take 30 mg (3 tablets) daily for 3 days, take 20 mg (2 tablets) daily for 3 days followed by 10 mg (1 tablet) daily for 3 days until complete., Disp: 30 tablet, Rfl: 0    rizatriptan (MAXALT) 10 mg tablet, One tablet under tongue May be repeated in 2 hs. Wait 72 hours before repeating regimen You can take Excedrin migraine for 72 Hs, before repeating Rizatriptan again., Disp: 30 tablet, Rfl: 3    traZODone (DESYREL) 50 mg tablet, Take 1 tablet (50 mg total) by mouth nightly., Disp: 90 tablet, Rfl: 3    BP Readings from Last 3 Encounters:   04/01/25 (!) 146/86   03/18/24 128/76   11/01/23 120/70       Recent Lab results:  Lab Results   Component Value Date    CHOL 235 (H) 07/03/2025   ,   Lab Results   Component Value Date    HDL 66 07/03/2025   ,   Lab Results   Component Value Date    LDLCALC 150 (H) 07/03/2025   ,   Lab Results   Component Value Date    TRIG 106 07/03/2025        Lab Results   Component Value Date    GLUCOSE 86 07/03/2025   ,   Lab Results   Component Value Date    HGBA1C 4.6 (L) 01/03/2020         Lab Results   Component Value Date    CREATININE 0.67  07/03/2025       Lab Results   Component Value Date    TSH 3.680 07/03/2025           Lab Results   Component Value Date    HGBA1C 4.6 (L) 01/03/2020

## 2025-07-11 RX ORDER — RIZATRIPTAN BENZOATE 10 MG/1
TABLET ORAL
Qty: 30 TABLET | Refills: 3 | Status: SHIPPED | OUTPATIENT
Start: 2025-07-11

## 2025-07-11 RX ORDER — LISDEXAMFETAMINE DIMESYLATE 20 MG/1
20 CAPSULE ORAL EVERY MORNING
COMMUNITY
End: 2025-07-11 | Stop reason: SDUPTHER

## 2025-07-11 RX ORDER — AMLODIPINE BESYLATE 5 MG/1
5 TABLET ORAL DAILY
Qty: 90 TABLET | Refills: 1 | Status: SHIPPED | OUTPATIENT
Start: 2025-07-11

## 2025-07-11 RX ORDER — TRAZODONE HYDROCHLORIDE 50 MG/1
50 TABLET ORAL NIGHTLY
Qty: 90 TABLET | Refills: 3 | Status: SHIPPED | OUTPATIENT
Start: 2025-07-11 | End: 2026-07-11

## 2025-07-11 RX ORDER — LISDEXAMFETAMINE DIMESYLATE 20 MG/1
20 CAPSULE ORAL EVERY MORNING
Qty: 30 CAPSULE | Refills: 0 | Status: SHIPPED | OUTPATIENT
Start: 2025-07-11 | End: 2025-08-10

## 2025-07-11 NOTE — TELEPHONE ENCOUNTER
Medicine Refill Request    Last Office Visit: 4/1/2025   Last Consult Visit: 5/12/2022  Last Telemedicine Visit: Visit date not found    Next Appointment: 8/13/2025      Current Outpatient Medications:     amLODIPine (NORVASC) 5 mg tablet, Take 1 tablet (5 mg total) by mouth daily., Disp: 90 tablet, Rfl: 1    cetirizine (ZyrTEC) 10 mg tablet, Take 1 tablet (10 mg total) by mouth daily., Disp: 90 tablet, Rfl: 0    cyclobenzaprine (FLEXERIL) 5 mg tablet, Take 1 tablet (5 mg total) by mouth daily as needed for muscle spasms., Disp: 30 tablet, Rfl: 1    fluticasone propionate (FLONASE) 50 mcg/actuation nasal spray, Administer 2 sprays into each nostril daily for 7 days, THEN 1 spray daily., Disp: 16 g, Rfl: 5    predniSONE (DELTASONE) 10 mg tablet, Take 40 mg (4 tablets) daily for 3 days, take 30 mg (3 tablets) daily for 3 days, take 20 mg (2 tablets) daily for 3 days followed by 10 mg (1 tablet) daily for 3 days until complete., Disp: 30 tablet, Rfl: 0    rizatriptan (MAXALT) 10 mg tablet, One tablet under tongue May be repeated in 2 hs. Wait 72 hours before repeating regimen You can take Excedrin migraine for 72 Hs, before repeating Rizatriptan again., Disp: 30 tablet, Rfl: 3    traZODone (DESYREL) 50 mg tablet, Take 1 tablet (50 mg total) by mouth nightly., Disp: 90 tablet, Rfl: 3    BP Readings from Last 3 Encounters:   04/01/25 (!) 146/86   03/18/24 128/76   11/01/23 120/70       Recent Lab results:  Lab Results   Component Value Date    CHOL 235 (H) 07/03/2025   ,   Lab Results   Component Value Date    HDL 66 07/03/2025   ,   Lab Results   Component Value Date    LDLCALC 150 (H) 07/03/2025   ,   Lab Results   Component Value Date    TRIG 106 07/03/2025        Lab Results   Component Value Date    GLUCOSE 86 07/03/2025   ,   Lab Results   Component Value Date    HGBA1C 4.6 (L) 01/03/2020         Lab Results   Component Value Date    CREATININE 0.67 07/03/2025       Lab Results   Component Value Date    TSH 3.680  07/03/2025           Lab Results   Component Value Date    HGBA1C 4.6 (L) 01/03/2020

## 2025-08-13 ENCOUNTER — OFFICE VISIT (OUTPATIENT)
Dept: FAMILY MEDICINE | Facility: CLINIC | Age: 55
End: 2025-08-13
Payer: COMMERCIAL

## 2025-08-13 VITALS
RESPIRATION RATE: 16 BRPM | OXYGEN SATURATION: 98 % | SYSTOLIC BLOOD PRESSURE: 126 MMHG | HEIGHT: 62 IN | BODY MASS INDEX: 25.4 KG/M2 | TEMPERATURE: 98 F | DIASTOLIC BLOOD PRESSURE: 84 MMHG | HEART RATE: 78 BPM | WEIGHT: 138 LBS

## 2025-08-13 DIAGNOSIS — I10 ESSENTIAL HYPERTENSION: Primary | Chronic | ICD-10-CM

## 2025-08-13 DIAGNOSIS — J30.2 SEASONAL ALLERGIES: Chronic | ICD-10-CM

## 2025-08-13 DIAGNOSIS — G47.9 FATIGUE DUE TO SLEEP PATTERN DISTURBANCE: ICD-10-CM

## 2025-08-13 DIAGNOSIS — F90.0 ATTENTION DEFICIT HYPERACTIVITY DISORDER, PREDOMINANTLY INATTENTIVE TYPE: ICD-10-CM

## 2025-08-13 DIAGNOSIS — G47.30 SLEEP APNEA IN ADULT: ICD-10-CM

## 2025-08-13 DIAGNOSIS — F32.A DEPRESSIVE DISORDER: ICD-10-CM

## 2025-08-13 DIAGNOSIS — R53.83 FATIGUE DUE TO SLEEP PATTERN DISTURBANCE: ICD-10-CM

## 2025-08-13 PROCEDURE — 3008F BODY MASS INDEX DOCD: CPT | Performed by: STUDENT IN AN ORGANIZED HEALTH CARE EDUCATION/TRAINING PROGRAM

## 2025-08-13 PROCEDURE — 99214 OFFICE O/P EST MOD 30 MIN: CPT | Performed by: STUDENT IN AN ORGANIZED HEALTH CARE EDUCATION/TRAINING PROGRAM

## 2025-08-13 RX ORDER — AMLODIPINE BESYLATE 5 MG/1
5 TABLET ORAL DAILY
Qty: 90 TABLET | Refills: 1 | Status: SHIPPED | OUTPATIENT
Start: 2025-08-13

## 2025-08-13 RX ORDER — TRAZODONE HYDROCHLORIDE 50 MG/1
50 TABLET ORAL NIGHTLY
Qty: 90 TABLET | Refills: 3 | Status: SHIPPED | OUTPATIENT
Start: 2025-08-13 | End: 2026-08-13